# Patient Record
Sex: MALE | Race: WHITE | NOT HISPANIC OR LATINO | Employment: FULL TIME | URBAN - METROPOLITAN AREA
[De-identification: names, ages, dates, MRNs, and addresses within clinical notes are randomized per-mention and may not be internally consistent; named-entity substitution may affect disease eponyms.]

---

## 2023-05-01 DIAGNOSIS — E11.39 TYPE II OR UNSPECIFIED TYPE DIABETES MELLITUS WITH OPHTHALMIC MANIFESTATIONS, UNCONTROLLED(250.52) (MULTI): ICD-10-CM

## 2023-05-01 DIAGNOSIS — E11.65 TYPE II OR UNSPECIFIED TYPE DIABETES MELLITUS WITH OPHTHALMIC MANIFESTATIONS, UNCONTROLLED(250.52) (MULTI): ICD-10-CM

## 2023-06-07 ENCOUNTER — LAB (OUTPATIENT)
Dept: LAB | Facility: LAB | Age: 73
End: 2023-06-07
Payer: MEDICARE

## 2023-06-07 DIAGNOSIS — E11.65 UNCONTROLLED TYPE 2 DIABETES MELLITUS WITH HYPERGLYCEMIA (MULTI): ICD-10-CM

## 2023-06-07 DIAGNOSIS — E78.2 MIXED HYPERLIPIDEMIA: Primary | ICD-10-CM

## 2023-06-07 DIAGNOSIS — E78.2 MIXED HYPERLIPIDEMIA: ICD-10-CM

## 2023-06-07 PROBLEM — E78.5 HYPERLIPIDEMIA: Status: ACTIVE | Noted: 2023-06-07

## 2023-06-07 PROBLEM — N40.0 BENIGN ENLARGEMENT OF PROSTATE: Status: ACTIVE | Noted: 2023-06-07

## 2023-06-07 PROBLEM — M19.012 PRIMARY OSTEOARTHRITIS OF LEFT SHOULDER: Status: ACTIVE | Noted: 2023-06-07

## 2023-06-07 PROBLEM — M47.812 CERVICAL OSTEOARTHRITIS: Status: ACTIVE | Noted: 2023-06-07

## 2023-06-07 PROBLEM — I49.3 PREMATURE VENTRICULAR CONTRACTIONS: Status: ACTIVE | Noted: 2023-06-07

## 2023-06-07 PROBLEM — M72.0 CONTRACTURE OF PALMAR FASCIA (DUPUYTREN'S): Status: ACTIVE | Noted: 2023-06-07

## 2023-06-07 PROBLEM — R35.1 NOCTURIA: Status: ACTIVE | Noted: 2023-06-07

## 2023-06-07 LAB
ALANINE AMINOTRANSFERASE (SGPT) (U/L) IN SER/PLAS: 33 U/L (ref 10–52)
ALBUMIN (G/DL) IN SER/PLAS: 4.6 G/DL (ref 3.4–5)
ALKALINE PHOSPHATASE (U/L) IN SER/PLAS: 66 U/L (ref 33–136)
ANION GAP IN SER/PLAS: 13 MMOL/L (ref 10–20)
ASPARTATE AMINOTRANSFERASE (SGOT) (U/L) IN SER/PLAS: 23 U/L (ref 9–39)
BILIRUBIN TOTAL (MG/DL) IN SER/PLAS: 0.8 MG/DL (ref 0–1.2)
CALCIUM (MG/DL) IN SER/PLAS: 9.8 MG/DL (ref 8.6–10.6)
CARBON DIOXIDE, TOTAL (MMOL/L) IN SER/PLAS: 28 MMOL/L (ref 21–32)
CHLORIDE (MMOL/L) IN SER/PLAS: 105 MMOL/L (ref 98–107)
CHOLESTEROL (MG/DL) IN SER/PLAS: 151 MG/DL (ref 0–199)
CHOLESTEROL IN HDL (MG/DL) IN SER/PLAS: 44.5 MG/DL
CHOLESTEROL/HDL RATIO: 3.4
CREATININE (MG/DL) IN SER/PLAS: 0.64 MG/DL (ref 0.5–1.3)
ESTIMATED AVERAGE GLUCOSE FOR HBA1C: 137 MG/DL
GFR MALE: >90 ML/MIN/1.73M2
GLUCOSE (MG/DL) IN SER/PLAS: 112 MG/DL (ref 74–99)
HEMOGLOBIN A1C/HEMOGLOBIN TOTAL IN BLOOD: 6.4 %
LDL: 90 MG/DL (ref 0–99)
POTASSIUM (MMOL/L) IN SER/PLAS: 4.6 MMOL/L (ref 3.5–5.3)
PROTEIN TOTAL: 6.7 G/DL (ref 6.4–8.2)
SODIUM (MMOL/L) IN SER/PLAS: 141 MMOL/L (ref 136–145)
TRIGLYCERIDE (MG/DL) IN SER/PLAS: 82 MG/DL (ref 0–149)
UREA NITROGEN (MG/DL) IN SER/PLAS: 23 MG/DL (ref 6–23)
VLDL: 16 MG/DL (ref 0–40)

## 2023-06-07 PROCEDURE — 80061 LIPID PANEL: CPT

## 2023-06-07 PROCEDURE — 36415 COLL VENOUS BLD VENIPUNCTURE: CPT

## 2023-06-07 PROCEDURE — 80053 COMPREHEN METABOLIC PANEL: CPT

## 2023-06-07 PROCEDURE — 83036 HEMOGLOBIN GLYCOSYLATED A1C: CPT

## 2023-06-07 RX ORDER — ROSUVASTATIN CALCIUM 10 MG/1
1 TABLET, COATED ORAL DAILY
COMMUNITY
End: 2023-06-09 | Stop reason: SDUPTHER

## 2023-06-07 RX ORDER — TAMSULOSIN HYDROCHLORIDE 0.4 MG/1
1 CAPSULE ORAL NIGHTLY
COMMUNITY
Start: 2018-11-09 | End: 2023-06-09 | Stop reason: SDUPTHER

## 2023-06-09 DIAGNOSIS — R35.1 NOCTURIA: ICD-10-CM

## 2023-06-09 DIAGNOSIS — E11.39 TYPE II OR UNSPECIFIED TYPE DIABETES MELLITUS WITH OPHTHALMIC MANIFESTATIONS, UNCONTROLLED(250.52) (MULTI): ICD-10-CM

## 2023-06-09 DIAGNOSIS — N40.0 ENLARGED PROSTATE: ICD-10-CM

## 2023-06-09 DIAGNOSIS — E78.5 HYPERLIPIDEMIA, UNSPECIFIED HYPERLIPIDEMIA TYPE: ICD-10-CM

## 2023-06-09 DIAGNOSIS — E11.65 TYPE II OR UNSPECIFIED TYPE DIABETES MELLITUS WITH OPHTHALMIC MANIFESTATIONS, UNCONTROLLED(250.52) (MULTI): ICD-10-CM

## 2023-06-09 NOTE — TELEPHONE ENCOUNTER
----- Message from Nikhil Dawn DO sent at 6/9/2023  2:06 PM EDT -----  Please schedule follow-up visit    Very happy for you and that you are sugar is better.  We can review this in office further    Hemoglobin A1c 6.4 previous 7.1    Electrolytes, kidney, liver, and protein normal    Cholesterol looks fantastic across-the-board

## 2023-06-09 NOTE — RESULT ENCOUNTER NOTE
Please schedule follow-up visit    Very happy for you and that you are sugar is better.  We can review this in office further    Hemoglobin A1c 6.4 previous 7.1    Electrolytes, kidney, liver, and protein normal    Cholesterol looks fantastic across-the-board

## 2023-06-13 RX ORDER — ROSUVASTATIN CALCIUM 10 MG/1
10 TABLET, COATED ORAL DAILY
Qty: 90 TABLET | Refills: 0 | Status: SHIPPED | OUTPATIENT
Start: 2023-06-13 | End: 2023-09-29 | Stop reason: SDUPTHER

## 2023-06-13 RX ORDER — TAMSULOSIN HYDROCHLORIDE 0.4 MG/1
0.4 CAPSULE ORAL NIGHTLY
Qty: 90 CAPSULE | Refills: 0 | Status: SHIPPED | OUTPATIENT
Start: 2023-06-13 | End: 2023-09-29 | Stop reason: SDUPTHER

## 2023-06-20 ENCOUNTER — APPOINTMENT (OUTPATIENT)
Dept: PRIMARY CARE | Facility: CLINIC | Age: 73
End: 2023-06-20
Payer: MEDICARE

## 2023-09-08 DIAGNOSIS — E78.2 MIXED HYPERLIPIDEMIA: Primary | ICD-10-CM

## 2023-09-08 DIAGNOSIS — E11.65 UNCONTROLLED TYPE 2 DIABETES MELLITUS WITH HYPERGLYCEMIA (MULTI): ICD-10-CM

## 2023-09-08 DIAGNOSIS — Z11.59 NEED FOR HEPATITIS C SCREENING TEST: ICD-10-CM

## 2023-09-08 DIAGNOSIS — Z12.5 PROSTATE CANCER SCREENING: ICD-10-CM

## 2023-09-08 DIAGNOSIS — Z00.00 ANNUAL PHYSICAL EXAM: ICD-10-CM

## 2023-09-08 DIAGNOSIS — Z11.4 ENCOUNTER FOR SCREENING FOR HIV: ICD-10-CM

## 2023-09-08 DIAGNOSIS — N13.8 BENIGN PROSTATIC HYPERPLASIA WITH URINARY OBSTRUCTION: ICD-10-CM

## 2023-09-08 DIAGNOSIS — N40.1 BENIGN PROSTATIC HYPERPLASIA WITH URINARY OBSTRUCTION: ICD-10-CM

## 2023-09-09 ENCOUNTER — LAB (OUTPATIENT)
Dept: LAB | Facility: LAB | Age: 73
End: 2023-09-09
Payer: MEDICARE

## 2023-09-09 DIAGNOSIS — N40.1 BENIGN PROSTATIC HYPERPLASIA WITH URINARY OBSTRUCTION: ICD-10-CM

## 2023-09-09 DIAGNOSIS — E11.65 UNCONTROLLED TYPE 2 DIABETES MELLITUS WITH HYPERGLYCEMIA (MULTI): ICD-10-CM

## 2023-09-09 DIAGNOSIS — Z12.5 PROSTATE CANCER SCREENING: ICD-10-CM

## 2023-09-09 DIAGNOSIS — E78.2 MIXED HYPERLIPIDEMIA: ICD-10-CM

## 2023-09-09 DIAGNOSIS — N13.8 BENIGN PROSTATIC HYPERPLASIA WITH URINARY OBSTRUCTION: ICD-10-CM

## 2023-09-09 LAB
ALANINE AMINOTRANSFERASE (SGPT) (U/L) IN SER/PLAS: 24 U/L (ref 10–52)
ALBUMIN (G/DL) IN SER/PLAS: 4.4 G/DL (ref 3.4–5)
ALBUMIN (MG/L) IN URINE: <7 MG/L
ALBUMIN/CREATININE (UG/MG) IN URINE: NORMAL UG/MG CRT (ref 0–30)
ALKALINE PHOSPHATASE (U/L) IN SER/PLAS: 62 U/L (ref 33–136)
ANION GAP IN SER/PLAS: 13 MMOL/L (ref 10–20)
ASPARTATE AMINOTRANSFERASE (SGOT) (U/L) IN SER/PLAS: 19 U/L (ref 9–39)
BILIRUBIN TOTAL (MG/DL) IN SER/PLAS: 0.7 MG/DL (ref 0–1.2)
CALCIUM (MG/DL) IN SER/PLAS: 9.6 MG/DL (ref 8.6–10.6)
CARBON DIOXIDE, TOTAL (MMOL/L) IN SER/PLAS: 28 MMOL/L (ref 21–32)
CHLORIDE (MMOL/L) IN SER/PLAS: 105 MMOL/L (ref 98–107)
CHOLESTEROL (MG/DL) IN SER/PLAS: 126 MG/DL (ref 0–199)
CHOLESTEROL IN HDL (MG/DL) IN SER/PLAS: 43 MG/DL
CHOLESTEROL/HDL RATIO: 2.9
CREATININE (MG/DL) IN SER/PLAS: 0.58 MG/DL (ref 0.5–1.3)
CREATININE (MG/DL) IN URINE: 61.2 MG/DL (ref 20–370)
ESTIMATED AVERAGE GLUCOSE FOR HBA1C: 128 MG/DL
GFR MALE: >90 ML/MIN/1.73M2
GLUCOSE (MG/DL) IN SER/PLAS: 117 MG/DL (ref 74–99)
HEMOGLOBIN A1C/HEMOGLOBIN TOTAL IN BLOOD: 6.1 %
LDL: 64 MG/DL (ref 0–99)
POTASSIUM (MMOL/L) IN SER/PLAS: 4 MMOL/L (ref 3.5–5.3)
PROSTATE SPECIFIC ANTIGEN,SCREEN: 3.61 NG/ML (ref 0–4)
PROTEIN TOTAL: 6.4 G/DL (ref 6.4–8.2)
SODIUM (MMOL/L) IN SER/PLAS: 142 MMOL/L (ref 136–145)
TRIGLYCERIDE (MG/DL) IN SER/PLAS: 94 MG/DL (ref 0–149)
UREA NITROGEN (MG/DL) IN SER/PLAS: 17 MG/DL (ref 6–23)
VLDL: 19 MG/DL (ref 0–40)

## 2023-09-09 PROCEDURE — 80061 LIPID PANEL: CPT

## 2023-09-09 PROCEDURE — 80053 COMPREHEN METABOLIC PANEL: CPT

## 2023-09-09 PROCEDURE — 83036 HEMOGLOBIN GLYCOSYLATED A1C: CPT

## 2023-09-09 PROCEDURE — 82043 UR ALBUMIN QUANTITATIVE: CPT

## 2023-09-09 PROCEDURE — G0103 PSA SCREENING: HCPCS

## 2023-09-09 PROCEDURE — 36415 COLL VENOUS BLD VENIPUNCTURE: CPT

## 2023-09-09 PROCEDURE — 82570 ASSAY OF URINE CREATININE: CPT

## 2023-09-12 NOTE — RESULT ENCOUNTER NOTE
Please schedule a visit so we can review blood work in office:  Hemoglobin A1c 6.1 previous 6.4, 7.1  Electrolytes, kidney, liver, urine for albumin normal  PSA 3.61 previous 2.88, 2.63, 2.62, an increase of 0.73 in the past 10 months which can be concerning for possible pathology  Cholesterol 126, 43, 64, 94

## 2023-09-29 ENCOUNTER — OFFICE VISIT (OUTPATIENT)
Dept: PRIMARY CARE | Facility: CLINIC | Age: 73
End: 2023-09-29
Payer: MEDICARE

## 2023-09-29 VITALS
SYSTOLIC BLOOD PRESSURE: 122 MMHG | DIASTOLIC BLOOD PRESSURE: 78 MMHG | BODY MASS INDEX: 22.08 KG/M2 | HEART RATE: 70 BPM | WEIGHT: 141 LBS

## 2023-09-29 DIAGNOSIS — E78.2 MIXED HYPERLIPIDEMIA: ICD-10-CM

## 2023-09-29 DIAGNOSIS — E11.9 CONTROLLED TYPE 2 DIABETES MELLITUS WITHOUT COMPLICATION, WITHOUT LONG-TERM CURRENT USE OF INSULIN (MULTI): ICD-10-CM

## 2023-09-29 DIAGNOSIS — M19.012 PRIMARY OSTEOARTHRITIS OF BOTH SHOULDERS: ICD-10-CM

## 2023-09-29 DIAGNOSIS — Z00.00 MEDICARE ANNUAL WELLNESS VISIT, SUBSEQUENT: Primary | ICD-10-CM

## 2023-09-29 DIAGNOSIS — N40.1 BENIGN PROSTATIC HYPERPLASIA WITH URINARY OBSTRUCTION: ICD-10-CM

## 2023-09-29 DIAGNOSIS — N13.8 BENIGN PROSTATIC HYPERPLASIA WITH URINARY OBSTRUCTION: ICD-10-CM

## 2023-09-29 DIAGNOSIS — M19.011 PRIMARY OSTEOARTHRITIS OF BOTH SHOULDERS: ICD-10-CM

## 2023-09-29 DIAGNOSIS — R97.20 INCREASED PROSTATE SPECIFIC ANTIGEN (PSA) VELOCITY: ICD-10-CM

## 2023-09-29 PROBLEM — G89.29 CHRONIC LLQ PAIN: Status: RESOLVED | Noted: 2023-09-29 | Resolved: 2023-09-29

## 2023-09-29 PROBLEM — G89.29 CHRONIC LLQ PAIN: Status: ACTIVE | Noted: 2023-09-29

## 2023-09-29 PROBLEM — M54.2 NECK PAIN: Status: ACTIVE | Noted: 2021-05-19

## 2023-09-29 PROBLEM — L73.9 FOLLICULITIS: Status: RESOLVED | Noted: 2023-09-29 | Resolved: 2023-09-29

## 2023-09-29 PROBLEM — M54.2 NECK PAIN: Status: RESOLVED | Noted: 2021-05-19 | Resolved: 2023-09-29

## 2023-09-29 PROBLEM — L73.9 FOLLICULITIS: Status: ACTIVE | Noted: 2023-09-29

## 2023-09-29 PROBLEM — R29.898 SHOULDER WEAKNESS: Status: RESOLVED | Noted: 2023-09-29 | Resolved: 2023-09-29

## 2023-09-29 PROBLEM — R35.1 NOCTURIA: Status: RESOLVED | Noted: 2023-06-07 | Resolved: 2023-09-29

## 2023-09-29 PROBLEM — D48.5 NEOPLASM OF UNCERTAIN BEHAVIOR OF SKIN: Status: RESOLVED | Noted: 2017-09-28 | Resolved: 2023-09-29

## 2023-09-29 PROBLEM — L73.8 OTHER SPECIFIED FOLLICULAR DISORDERS: Status: RESOLVED | Noted: 2017-09-28 | Resolved: 2023-09-29

## 2023-09-29 PROBLEM — L57.0 ACTINIC KERATOSIS: Status: ACTIVE | Noted: 2017-09-28

## 2023-09-29 PROBLEM — R29.898 SHOULDER WEAKNESS: Status: ACTIVE | Noted: 2023-09-29

## 2023-09-29 PROBLEM — D48.5 NEOPLASM OF UNCERTAIN BEHAVIOR OF SKIN: Status: ACTIVE | Noted: 2017-09-28

## 2023-09-29 PROBLEM — R10.32 CHRONIC LLQ PAIN: Status: ACTIVE | Noted: 2023-09-29

## 2023-09-29 PROBLEM — R10.32 CHRONIC LLQ PAIN: Status: RESOLVED | Noted: 2023-09-29 | Resolved: 2023-09-29

## 2023-09-29 PROBLEM — L73.8 OTHER SPECIFIED FOLLICULAR DISORDERS: Status: ACTIVE | Noted: 2017-09-28

## 2023-09-29 PROBLEM — M75.112 NONTRAUMATIC INCOMPLETE TEAR OF LEFT ROTATOR CUFF: Status: ACTIVE | Noted: 2023-09-29

## 2023-09-29 PROBLEM — M67.912 DYSFUNCTION OF LEFT ROTATOR CUFF: Status: ACTIVE | Noted: 2023-09-29

## 2023-09-29 PROBLEM — E11.65 UNCONTROLLED TYPE 2 DIABETES MELLITUS WITH HYPERGLYCEMIA (MULTI): Status: RESOLVED | Noted: 2023-06-07 | Resolved: 2023-09-29

## 2023-09-29 PROCEDURE — 1036F TOBACCO NON-USER: CPT | Performed by: FAMILY MEDICINE

## 2023-09-29 PROCEDURE — 20610 DRAIN/INJ JOINT/BURSA W/O US: CPT | Performed by: FAMILY MEDICINE

## 2023-09-29 PROCEDURE — 1160F RVW MEDS BY RX/DR IN RCRD: CPT | Performed by: FAMILY MEDICINE

## 2023-09-29 PROCEDURE — 3044F HG A1C LEVEL LT 7.0%: CPT | Performed by: FAMILY MEDICINE

## 2023-09-29 PROCEDURE — 1159F MED LIST DOCD IN RCRD: CPT | Performed by: FAMILY MEDICINE

## 2023-09-29 PROCEDURE — 3078F DIAST BP <80 MM HG: CPT | Performed by: FAMILY MEDICINE

## 2023-09-29 PROCEDURE — 3074F SYST BP LT 130 MM HG: CPT | Performed by: FAMILY MEDICINE

## 2023-09-29 PROCEDURE — 1170F FXNL STATUS ASSESSED: CPT | Performed by: FAMILY MEDICINE

## 2023-09-29 PROCEDURE — 1126F AMNT PAIN NOTED NONE PRSNT: CPT | Performed by: FAMILY MEDICINE

## 2023-09-29 PROCEDURE — G0439 PPPS, SUBSEQ VISIT: HCPCS | Performed by: FAMILY MEDICINE

## 2023-09-29 PROCEDURE — 99214 OFFICE O/P EST MOD 30 MIN: CPT | Performed by: FAMILY MEDICINE

## 2023-09-29 RX ORDER — TRIAMCINOLONE ACETONIDE 40 MG/ML
40 INJECTION, SUSPENSION INTRA-ARTICULAR; INTRAMUSCULAR ONCE
Status: COMPLETED | OUTPATIENT
Start: 2023-09-29 | End: 2023-09-29

## 2023-09-29 RX ORDER — ROSUVASTATIN CALCIUM 10 MG/1
10 TABLET, COATED ORAL DAILY
Qty: 90 TABLET | Refills: 1 | Status: SHIPPED | OUTPATIENT
Start: 2023-09-29 | End: 2024-04-30 | Stop reason: SDUPTHER

## 2023-09-29 RX ORDER — TAMSULOSIN HYDROCHLORIDE 0.4 MG/1
0.4 CAPSULE ORAL NIGHTLY
Qty: 90 CAPSULE | Refills: 1 | Status: SHIPPED | OUTPATIENT
Start: 2023-09-29 | End: 2024-03-27 | Stop reason: SDUPTHER

## 2023-09-29 RX ADMIN — TRIAMCINOLONE ACETONIDE 40 MG: 40 INJECTION, SUSPENSION INTRA-ARTICULAR; INTRAMUSCULAR at 08:16

## 2023-09-29 ASSESSMENT — ACTIVITIES OF DAILY LIVING (ADL)
DOING_HOUSEWORK: INDEPENDENT
GROCERY_SHOPPING: INDEPENDENT
MANAGING_FINANCES: INDEPENDENT
BATHING: INDEPENDENT
DRESSING: INDEPENDENT
TAKING_MEDICATION: INDEPENDENT

## 2023-09-29 ASSESSMENT — ENCOUNTER SYMPTOMS
OCCASIONAL FEELINGS OF UNSTEADINESS: 0
LOSS OF SENSATION IN FEET: 0
DEPRESSION: 0

## 2023-09-29 ASSESSMENT — PATIENT HEALTH QUESTIONNAIRE - PHQ9
2. FEELING DOWN, DEPRESSED OR HOPELESS: NOT AT ALL
SUM OF ALL RESPONSES TO PHQ9 QUESTIONS 1 AND 2: 0
1. LITTLE INTEREST OR PLEASURE IN DOING THINGS: NOT AT ALL

## 2023-09-29 NOTE — PROGRESS NOTES
Subjective   Reason for Visit: Khadar Hale is an 73 y.o. male here for a Medicare Annual Wellness Visit Subsequent, Hyperlipidemia, and Anxiety.     Past Medical, Surgical, and Family History reviewed and updated in chart.    Reviewed all medications by prescribing practitioner or clinical pharmacist (such as prescriptions, OTCs, herbal therapies and supplements) and documented in the medical record.    HPI  1.  Medicare wellness.  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    2.  Hyperlipidemia.  Currently taking rosuvastatin 10 mg daily  Had blood work performed in anticipation of this visit, noted below  Denies medication side effects, including myalgias    3.  Diabetes.  Hemoglobin A1c 6.1 previous 6.4, 7.1  Electrolytes, kidney, liver, urine for albumin normal  Cholesterol 126, 43, 64, 94    4.  BPH.  Currently taking tamsulosin 0.4 mg at bedtime  PSA 3.61 previous 2.88, 2.63, 2.62, an increase of 0.73 in the past 10 months which can be concerning for possible pathology    5.  Left-sided Dupuytren's contracture.  Had revision of his palmar fasciectomy of the left hand via Dr. Gonzalez this past summer    6.  Bilateral shoulder pain.  Has been told that he has severe arthritis bilaterally, received cortisone injections about 6 to 7 months ago, hoping to have both today  He was told that he needs shoulder replacements but he is not going to even entertain that thought    Review of Systems  All pertinent positive symptoms are included in the history of present illness.    All other systems have been reviewed and are negative and noncontributory to this patient's current ailments.    Current Outpatient Medications   Medication Instructions    empagliflozin (JARDIANCE) 10 mg, oral, Daily    rosuvastatin (CRESTOR) 10 mg,  oral, Daily    tamsulosin (FLOMAX) 0.4 mg, oral, Nightly     Allergies   Allergen Reactions    Codeine Nausea Only     Immunization History   Administered Date(s) Administered    Pfizer Purple Cap SARS-CoV-2 02/06/2021, 03/01/2021, 12/04/2021     Past Surgical History:   Procedure Laterality Date    ABLATION OF DYSRHYTHMIC FOCUS  05/29/2014    Catheter Ablation    HAND TENDON SURGERY  05/29/2014    Hand Incision Tendon Sheath Of A Finger    HERNIA REPAIR  05/29/2014    Hernia Repair     No family history on file.    Social History     Tobacco Use    Smoking status: Never    Smokeless tobacco: Never     Patient Self Assessment of Health Status  Patient Self Assessment: Good    Nutrition and Exercise  Current Diet: Well Balanced Diet  Adequate Fluid Intake: Yes  Caffeine: Yes  Exercise Frequency: Infrequently    Functional Ability/Level of Safety  Cognitive Impairment Observed: No cognitive impairment observed  Cognitive Impairment Reported: No cognitive impairment reported by patient or family    Home Safety Risk Factors: None    Objective   Visit Vitals  /78   Pulse 70   Wt 64 kg (141 lb)   BMI 22.08 kg/m²   Smoking Status Never   BSA 1.74 m²      Physical Exam  CONSTITUTIONAL - well nourished, well developed, looks like stated age, in no acute distress, not ill-appearing, and not tired appearing  SKIN - normal skin color and pigmentation  EYES - normal external exam  LUNGS - breathing comfortably, no dyspnea  EXTREMITIES - no deformities noticeable; bilateral shoulders with some crepitus, range of motion normal, no obvious bony deformities, redness, warmth, ecchymosis  NEUROLOGICAL - oriented and no focal signs  PSYCHIATRIC - alert, pleasant and cordial, age-appropriate, not anxious or depressed appearing    Joint Injection Large/Arthrocentesis: bilateral glenohumeral on 9/29/2023 8:04 AM  Details: 22 G needle, posterior approach  Outcome: tolerated well, no immediate complications    1% lidocaine (2 mL) and  Kenalog 40 mg injected into the joint as noted on the examination. It was successful, without complication, and tolerated extremely well.  Procedure, treatment alternatives, risks and benefits explained, specific risks discussed. Consent was given by the patient. Immediately prior to procedure a time out was called to verify the correct patient, procedure, equipment, support staff and site/side marked as required. Patient was prepped and draped in the usual sterile fashion.           Assessment/Plan   Problem List Items Addressed This Visit       Benign enlargement of prostate    Current Assessment & Plan     Tamsulosin continues to benefit you, so no changes to medication recommended         Relevant Medications    tamsulosin (Flomax) 0.4 mg 24 hr capsule    Hyperlipidemia    Current Assessment & Plan     Stable, no changes to medication recommended         Relevant Medications    rosuvastatin (Crestor) 10 mg tablet    Primary osteoarthritis of left shoulder    Relevant Medications  Cortisone injection provided today for both acute and long-term relief  Please return to the clinic if pain, swelling, or signs of infection occur. It will be necessary to further    evaluate you at that time, along with considering the possibility of an orthopedic consultation to provide further medical management    triamcinolone acetonide (Kenalog-40) injection 40 mg (Completed)    triamcinolone acetonide (Kenalog-40) injection 40 mg (Completed)    Controlled type 2 diabetes mellitus without complication, without long-term current use of insulin (CMS/formerly [facility])    Current Assessment & Plan     Congratulations, hemoglobin A1c has improved, no changes to medication recommended  Provided you with 3 months of samples for Jardiance, I will send another 3 months to the pharmacy for you         Relevant Medications    empagliflozin (Jardiance) 10 mg    Medicare annual wellness visit, subsequent - Primary    Current Assessment & Plan     Questions  were answered and reviewed         Increased prostate specific antigen (PSA) velocity    Current Assessment & Plan     PSA has increased by 0.73 in the past 10 months so due to this velocity increase, please repeat PSA in 2 months and if it continues to increase then urology consult warranted to evaluate for possible prostate cancer         Relevant Orders    PSA     Current Outpatient Medications   Medication Instructions    empagliflozin (JARDIANCE) 10 mg, oral, Daily    rosuvastatin (CRESTOR) 10 mg, oral, Daily    tamsulosin (FLOMAX) 0.4 mg, oral, Nightly     Patient Care Team:  Nikhil Dawn DO as PCP - General (Family Medicine)  Nikhil Dawn DO as PCP - [facility] Attributed Provider

## 2023-09-29 NOTE — ASSESSMENT & PLAN NOTE
Chief Complaint   Patient presents with    Follow-up     Visit Vitals  BP (!) 186/105 (BP 1 Location: Left upper arm, BP Patient Position: Sitting, BP Cuff Size: Large adult)   Pulse 60   Resp 12   Ht 5' 11\" (1.803 m)   Wt 209 lb (94.8 kg)   SpO2 98%   BMI 29.15 kg/m²     1. \"Have you been to the ER, urgent care clinic since your last visit? Hospitalized since your last visit? \" No    2. \"Have you seen or consulted any other health care providers outside of the [address] [place] [address] since your last visit? \" No     3. For patients aged 39-70: Has the patient had a colonoscopy / FIT/ Cologuard? NA - based on age      If the patient is female:    4. For patients aged 41-77: Has the patient had a mammogram within the past 2 years? NA - based on age or sex      11. For patients aged 21-65: Has the patient had a pap smear?  NA - based on age or sex PSA has increased by 0.73 in the past 10 months so due to this velocity increase, please repeat PSA in 2 months and if it continues to increase then urology consult warranted to evaluate for possible prostate cancer

## 2023-09-29 NOTE — ASSESSMENT & PLAN NOTE
Congratulations, hemoglobin A1c has improved, no changes to medication recommended  Provided you with 3 months of samples for Jardiance, I will send another 3 months to the pharmacy for you

## 2024-01-06 ENCOUNTER — LAB (OUTPATIENT)
Dept: LAB | Facility: LAB | Age: 74
End: 2024-01-06
Payer: MEDICARE

## 2024-01-06 DIAGNOSIS — R97.20 INCREASED PROSTATE SPECIFIC ANTIGEN (PSA) VELOCITY: ICD-10-CM

## 2024-01-06 LAB — PSA SERPL-MCNC: 3.62 NG/ML

## 2024-01-06 PROCEDURE — 36415 COLL VENOUS BLD VENIPUNCTURE: CPT

## 2024-01-06 PROCEDURE — 84153 ASSAY OF PSA TOTAL: CPT

## 2024-01-07 NOTE — RESULT ENCOUNTER NOTE
PSA 3.62, previously 3.61, 2.88, 2.63, 2.62, an increase of 0.73 in the past 10 months which can be concerning for possible pathology    Glad to see that this has remained stable so we will continue to monitor annually

## 2024-03-27 ENCOUNTER — OFFICE VISIT (OUTPATIENT)
Dept: PRIMARY CARE | Facility: CLINIC | Age: 74
End: 2024-03-27
Payer: MEDICARE

## 2024-03-27 VITALS
DIASTOLIC BLOOD PRESSURE: 70 MMHG | WEIGHT: 140 LBS | HEART RATE: 60 BPM | BODY MASS INDEX: 21.97 KG/M2 | SYSTOLIC BLOOD PRESSURE: 124 MMHG | HEIGHT: 67 IN

## 2024-03-27 DIAGNOSIS — E78.2 MIXED HYPERLIPIDEMIA: ICD-10-CM

## 2024-03-27 DIAGNOSIS — N13.8 BENIGN PROSTATIC HYPERPLASIA WITH URINARY OBSTRUCTION: ICD-10-CM

## 2024-03-27 DIAGNOSIS — E11.9 CONTROLLED TYPE 2 DIABETES MELLITUS WITHOUT COMPLICATION, WITHOUT LONG-TERM CURRENT USE OF INSULIN (MULTI): Primary | ICD-10-CM

## 2024-03-27 DIAGNOSIS — M19.012 PRIMARY OSTEOARTHRITIS OF BOTH SHOULDERS: ICD-10-CM

## 2024-03-27 DIAGNOSIS — N40.1 BENIGN PROSTATIC HYPERPLASIA WITH URINARY OBSTRUCTION: ICD-10-CM

## 2024-03-27 DIAGNOSIS — M19.011 PRIMARY OSTEOARTHRITIS OF BOTH SHOULDERS: ICD-10-CM

## 2024-03-27 PROBLEM — L29.9 ITCHING: Status: ACTIVE | Noted: 2024-03-27

## 2024-03-27 PROBLEM — B35.0 TINEA CAPITIS: Status: ACTIVE | Noted: 2024-03-27

## 2024-03-27 PROBLEM — M75.102 NONTRAUMATIC TEAR OF LEFT ROTATOR CUFF: Status: ACTIVE | Noted: 2024-03-27

## 2024-03-27 PROBLEM — D17.0 LIPOMA OF SKIN AND SUBCUTANEOUS TISSUE OF NECK: Status: ACTIVE | Noted: 2024-03-27

## 2024-03-27 PROBLEM — R00.2 PALPITATIONS: Status: ACTIVE | Noted: 2024-03-27

## 2024-03-27 PROBLEM — M25.649 STIFFNESS OF FINGER JOINT: Status: ACTIVE | Noted: 2024-03-27

## 2024-03-27 PROBLEM — R20.0 NUMBNESS OF HAND: Status: ACTIVE | Noted: 2024-03-27

## 2024-03-27 PROBLEM — M75.00 ADHESIVE CAPSULITIS OF SHOULDER: Status: ACTIVE | Noted: 2024-03-27

## 2024-03-27 PROBLEM — K40.91 RECURRENT INGUINAL HERNIA: Status: ACTIVE | Noted: 2024-03-27

## 2024-03-27 PROCEDURE — 20610 DRAIN/INJ JOINT/BURSA W/O US: CPT | Performed by: FAMILY MEDICINE

## 2024-03-27 PROCEDURE — 1036F TOBACCO NON-USER: CPT | Performed by: FAMILY MEDICINE

## 2024-03-27 PROCEDURE — 3048F LDL-C <100 MG/DL: CPT | Performed by: FAMILY MEDICINE

## 2024-03-27 PROCEDURE — 3044F HG A1C LEVEL LT 7.0%: CPT | Performed by: FAMILY MEDICINE

## 2024-03-27 PROCEDURE — 3078F DIAST BP <80 MM HG: CPT | Performed by: FAMILY MEDICINE

## 2024-03-27 PROCEDURE — 1159F MED LIST DOCD IN RCRD: CPT | Performed by: FAMILY MEDICINE

## 2024-03-27 PROCEDURE — 1160F RVW MEDS BY RX/DR IN RCRD: CPT | Performed by: FAMILY MEDICINE

## 2024-03-27 PROCEDURE — 99214 OFFICE O/P EST MOD 30 MIN: CPT | Performed by: FAMILY MEDICINE

## 2024-03-27 PROCEDURE — 3074F SYST BP LT 130 MM HG: CPT | Performed by: FAMILY MEDICINE

## 2024-03-27 PROCEDURE — 1123F ACP DISCUSS/DSCN MKR DOCD: CPT | Performed by: FAMILY MEDICINE

## 2024-03-27 PROCEDURE — 1158F ADVNC CARE PLAN TLK DOCD: CPT | Performed by: FAMILY MEDICINE

## 2024-03-27 RX ORDER — TRIAMCINOLONE ACETONIDE 40 MG/ML
40 INJECTION, SUSPENSION INTRA-ARTICULAR; INTRAMUSCULAR ONCE
Status: COMPLETED | OUTPATIENT
Start: 2024-03-27 | End: 2024-03-27

## 2024-03-27 RX ORDER — TAMSULOSIN HYDROCHLORIDE 0.4 MG/1
0.4 CAPSULE ORAL NIGHTLY
Qty: 90 CAPSULE | Refills: 1 | Status: SHIPPED | OUTPATIENT
Start: 2024-03-27 | End: 2024-09-23

## 2024-03-27 RX ADMIN — TRIAMCINOLONE ACETONIDE 40 MG: 40 INJECTION, SUSPENSION INTRA-ARTICULAR; INTRAMUSCULAR at 11:48

## 2024-03-27 NOTE — ASSESSMENT & PLAN NOTE
A refill for tamsulosin has been sent to the pharmacy as it has been improving your symptoms. We will continue to monitor your PSA levels yearly.

## 2024-03-27 NOTE — ASSESSMENT & PLAN NOTE
We will obtain a repeat hemoglobin A1C at this visit. Once the results are available, we will send over a refill for your medication.

## 2024-03-27 NOTE — ASSESSMENT & PLAN NOTE
Provide you with cortisone injections in both shoulders, hoping for both acute and long-term relief  OTC turmeric was recommended to help with joint pain and inflammation

## 2024-03-27 NOTE — PROGRESS NOTES
Subjective   Patient ID: Khadar Hale is a 73 y.o. male who presents for Shoulder Pain, Diabetes, and Hyperlipidemia.    Past Medical, Surgical, and Family History reviewed and updated in chart.    Reviewed all medications by prescribing practitioner or clinical pharmacist (such as prescriptions, OTCs, herbal therapies and supplements) and documented in the medical record.    HPI  1.  Bilateral shoulder pain  Patient has been getting steroid injections for osteoarthritis in both shoulders  Khadar states that the injections improve his symptoms for about 5 months  The orthopedic surgeon has told Khadar he will likely need surgery, but Khadar prefers steroid injections for now as they are reducing his symptoms  Would like to know if there is something else that is OTC he can use to help with joint pain    2.  Hyperlipidemia  His last lipid panel was completed in 9/9/23 and was excellent  -chol was 126, HDL 43, LDL 64 and triglycerides were 94  He is currently on crestor 10 mg and is requesting a refill at this time    3.  Benign enlargement of prostate  He is currently on Flomax 0.4 mg which has been controlling his symptoms  Khadar is requesting a refill of the medication at this time  PSA from 1/2024 was 3.62 and was previously 3.20 from one year ago    4.  Type II diabetes  Khadar's last hemoglobin A1c from 9/2023 was 6.1%  He is currently on Jardiance 10 mg and is requesting a refill for his medication   Most recent CMP was remarkable for no dysfunction regarding his liver and or kidney    Review of Systems  All pertinent positive symptoms are included in the history of present illness.    All other systems have been reviewed and are negative and noncontributory to this patient's current ailments.    Past Medical History:   Diagnosis Date    Benign prostatic hyperplasia without lower urinary tract symptoms 11/29/2022    Benign enlargement of prostate    Encounter for general adult medical examination without  "abnormal findings 01/28/2020    Medicare annual wellness visit, initial    Encounter for general adult medical examination without abnormal findings 04/06/2021    Encounter for Medicare annual wellness exam    Encounter for screening for malignant neoplasm of colon 11/11/2020    Encounter for screening colonoscopy    Hyperlipidemia, unspecified 11/29/2022    Hyperlipidemia    Other hypertrophic disorders of the skin 08/25/2016    Cutaneous skin tags    Personal history of other benign neoplasm 09/18/2019    History of other benign neoplasm    Personal history of other diseases of the musculoskeletal system and connective tissue 09/19/2019    History of neck pain    Personal history of other infectious and parasitic diseases 09/18/2019    History of tinea capitis    Personal history of other specified conditions 08/25/2016    History of itching    Ventricular premature depolarization 03/26/2018    Premature ventricular contractions     Past Surgical History:   Procedure Laterality Date    ABLATION OF DYSRHYTHMIC FOCUS  05/29/2014    Catheter Ablation    HAND TENDON SURGERY  05/29/2014    Hand Incision Tendon Sheath Of A Finger    HERNIA REPAIR  05/29/2014    Hernia Repair     Social History     Tobacco Use    Smoking status: Never    Smokeless tobacco: Never     No family history on file.  Immunization History   Administered Date(s) Administered    Pfizer Purple Cap SARS-CoV-2 02/06/2021, 03/01/2021, 12/04/2021     Current Outpatient Medications   Medication Instructions    empagliflozin (JARDIANCE) 10 mg, oral, Daily    rosuvastatin (CRESTOR) 10 mg, oral, Daily    tamsulosin (FLOMAX) 0.4 mg, oral, Nightly     Allergies   Allergen Reactions    Codeine Nausea Only       Objective   Vitals:    03/27/24 1102   BP: 124/70   Pulse: 60   Weight: 63.5 kg (140 lb)   Height: 1.702 m (5' 7\")     Body mass index is 21.93 kg/m².    BP Readings from Last 3 Encounters:   03/27/24 124/70   09/29/23 122/78   02/13/23 118/70      Wt " Readings from Last 3 Encounters:   03/27/24 63.5 kg (140 lb)   09/29/23 64 kg (141 lb)   02/13/23 65.3 kg (144 lb)      Physical Exam  CONSTITUTIONAL - well nourished, well developed, looks like stated age, in no acute distress, not ill-appearing, and not tired appearing  SKIN - normal skin color and pigmentation  EYES - normal external exam  LUNGS - breathing comfortably, no dyspnea  EXTREMITIES - no deformities noticeable; bilateral shoulders with some crepitus, range of motion normal, no obvious bony deformities, redness, warmth, ecchymosis  NEUROLOGICAL - oriented and no focal signs  PSYCHIATRIC - alert, pleasant and cordial, age-appropriate, not anxious or depressed appearing    Joint Injection Large/Arthrocentesis: bilateral glenohumeral on 3/27/2024 11:48 AM  Indications: pain  Details: 22 G needle, posterior approach  Outcome: tolerated well, no immediate complications  Consent was given by the patient. Immediately prior to procedure a time out was called to verify the correct patient, procedure, equipment, support staff and site/side marked as required. Patient was prepped and draped in the usual sterile fashion.       Assessment/Plan   Problem List Items Addressed This Visit       Benign enlargement of prostate     A refill for tamsulosin has been sent to the pharmacy as it has been improving your symptoms. We will continue to monitor your PSA levels yearly.         Relevant Medications    tamsulosin (Flomax) 0.4 mg 24 hr capsule    Other Relevant Orders    PSA    Hyperlipidemia     Please complete your repeat lipid panel at your earliest convenience. Once the results are available, we will send over a refill for your Crestor.         Relevant Orders    Lipid panel    Controlled type 2 diabetes mellitus without complication, without long-term current use of insulin (CMS/formerly [facility]) - Primary     We will obtain a repeat hemoglobin A1C at this visit. Once the results are available, we will send over a refill for  your medication.          Relevant Orders    Comprehensive metabolic panel    Hemoglobin A1c    Primary osteoarthritis of both shoulders     Provide you with cortisone injections in both shoulders, hoping for both acute and long-term relief  OTC turmeric was recommended to help with joint pain and inflammation         Relevant Medications    triamcinolone acetonide (Kenalog-40) injection 40 mg (Completed) (Start on 3/27/2024 12:15 PM)    triamcinolone acetonide (Kenalog-40) injection 40 mg (Completed) (Start on 3/27/2024 12:15 PM)    Other Relevant Orders    Joint Injection Large/Arthrocentesis: bilateral glenohumeral

## 2024-03-27 NOTE — ASSESSMENT & PLAN NOTE
Please complete your repeat lipid panel at your earliest convenience. Once the results are available, we will send over a refill for your Crestor.

## 2024-04-24 ENCOUNTER — TELEPHONE (OUTPATIENT)
Dept: PRIMARY CARE | Facility: CLINIC | Age: 74
End: 2024-04-24
Payer: MEDICARE

## 2024-04-24 DIAGNOSIS — M67.912 DYSFUNCTION OF LEFT ROTATOR CUFF: Primary | ICD-10-CM

## 2024-04-24 NOTE — TELEPHONE ENCOUNTER
Wants ortho referral for shoulder as the injection did not provide benefit     Not sure if you want to add a specific doctor at this time

## 2024-04-27 ENCOUNTER — LAB (OUTPATIENT)
Dept: LAB | Facility: LAB | Age: 74
End: 2024-04-27
Payer: MEDICARE

## 2024-04-27 DIAGNOSIS — N40.1 BENIGN PROSTATIC HYPERPLASIA WITH URINARY OBSTRUCTION: ICD-10-CM

## 2024-04-27 DIAGNOSIS — E11.9 CONTROLLED TYPE 2 DIABETES MELLITUS WITHOUT COMPLICATION, WITHOUT LONG-TERM CURRENT USE OF INSULIN (MULTI): ICD-10-CM

## 2024-04-27 DIAGNOSIS — E78.2 MIXED HYPERLIPIDEMIA: ICD-10-CM

## 2024-04-27 DIAGNOSIS — N13.8 BENIGN PROSTATIC HYPERPLASIA WITH URINARY OBSTRUCTION: ICD-10-CM

## 2024-04-27 PROCEDURE — 80053 COMPREHEN METABOLIC PANEL: CPT

## 2024-04-27 PROCEDURE — 36415 COLL VENOUS BLD VENIPUNCTURE: CPT

## 2024-04-27 PROCEDURE — 83036 HEMOGLOBIN GLYCOSYLATED A1C: CPT

## 2024-04-27 PROCEDURE — 80061 LIPID PANEL: CPT

## 2024-04-27 PROCEDURE — 84153 ASSAY OF PSA TOTAL: CPT

## 2024-04-28 LAB
ALBUMIN SERPL BCP-MCNC: 4.5 G/DL (ref 3.4–5)
ALP SERPL-CCNC: 52 U/L (ref 33–136)
ALT SERPL W P-5'-P-CCNC: 36 U/L (ref 10–52)
ANION GAP SERPL CALC-SCNC: 15 MMOL/L (ref 10–20)
AST SERPL W P-5'-P-CCNC: 22 U/L (ref 9–39)
BILIRUB SERPL-MCNC: 0.8 MG/DL (ref 0–1.2)
BUN SERPL-MCNC: 17 MG/DL (ref 6–23)
CALCIUM SERPL-MCNC: 9.3 MG/DL (ref 8.6–10.6)
CHLORIDE SERPL-SCNC: 101 MMOL/L (ref 98–107)
CHOLEST SERPL-MCNC: 144 MG/DL (ref 0–199)
CHOLESTEROL/HDL RATIO: 2.6
CO2 SERPL-SCNC: 29 MMOL/L (ref 21–32)
CREAT SERPL-MCNC: 0.63 MG/DL (ref 0.5–1.3)
EGFRCR SERPLBLD CKD-EPI 2021: >90 ML/MIN/1.73M*2
GLUCOSE SERPL-MCNC: 125 MG/DL (ref 74–99)
HDLC SERPL-MCNC: 56.1 MG/DL
LDLC SERPL CALC-MCNC: 72 MG/DL
NON HDL CHOLESTEROL: 88 MG/DL (ref 0–149)
POTASSIUM SERPL-SCNC: 4.7 MMOL/L (ref 3.5–5.3)
PROT SERPL-MCNC: 6.7 G/DL (ref 6.4–8.2)
PSA SERPL-MCNC: 4.49 NG/ML
SODIUM SERPL-SCNC: 140 MMOL/L (ref 136–145)
TRIGL SERPL-MCNC: 82 MG/DL (ref 0–149)
VLDL: 16 MG/DL (ref 0–40)

## 2024-04-29 LAB
EST. AVERAGE GLUCOSE BLD GHB EST-MCNC: 148 MG/DL
HBA1C MFR BLD: 6.8 %

## 2024-04-30 DIAGNOSIS — E78.2 MIXED HYPERLIPIDEMIA: ICD-10-CM

## 2024-04-30 DIAGNOSIS — E11.9 CONTROLLED TYPE 2 DIABETES MELLITUS WITHOUT COMPLICATION, WITHOUT LONG-TERM CURRENT USE OF INSULIN (MULTI): ICD-10-CM

## 2024-04-30 RX ORDER — ROSUVASTATIN CALCIUM 10 MG/1
10 TABLET, COATED ORAL DAILY
Qty: 90 TABLET | Refills: 1 | Status: SHIPPED | OUTPATIENT
Start: 2024-04-30 | End: 2024-10-27

## 2024-04-30 NOTE — RESULT ENCOUNTER NOTE
Hemoglobin A1c is 6.8 previous 6.1.  Ideally we would like to see this a little bit better, but goal is less than 7.0% so no changes to the medication are recommended at this time    Electrolytes, kidney, liver normal    PSA elevated at 4.49 previously 3.62.  I realize you have an enlarged prostate, but when the number jumped by 0.75 in a year there is some concern that this could be prostate cancer so I would recommend repeating the blood test in 3 months and if it continues to jump, then we should get urology involved to determine whether or not we have to worry about cancer    Cholesterol looks excellent, so no changes to medication are recommended    I have sent your medicine out to the mail out pharmacy on file

## 2024-07-29 ENCOUNTER — APPOINTMENT (OUTPATIENT)
Dept: ORTHOPEDIC SURGERY | Facility: CLINIC | Age: 74
End: 2024-07-29
Payer: MEDICARE

## 2024-07-29 ENCOUNTER — HOSPITAL ENCOUNTER (OUTPATIENT)
Dept: RADIOLOGY | Facility: CLINIC | Age: 74
Discharge: HOME | End: 2024-07-29
Payer: MEDICARE

## 2024-07-29 VITALS — HEIGHT: 67 IN | BODY MASS INDEX: 21.97 KG/M2 | WEIGHT: 140 LBS

## 2024-07-29 DIAGNOSIS — M25.551 RIGHT HIP PAIN: ICD-10-CM

## 2024-07-29 DIAGNOSIS — M76.31 ILIOTIBIAL BAND SYNDROME OF RIGHT SIDE: ICD-10-CM

## 2024-07-29 PROCEDURE — 73502 X-RAY EXAM HIP UNI 2-3 VIEWS: CPT | Mod: RIGHT SIDE | Performed by: RADIOLOGY

## 2024-07-29 PROCEDURE — 1036F TOBACCO NON-USER: CPT | Performed by: ORTHOPAEDIC SURGERY

## 2024-07-29 PROCEDURE — 1160F RVW MEDS BY RX/DR IN RCRD: CPT | Performed by: ORTHOPAEDIC SURGERY

## 2024-07-29 PROCEDURE — 73502 X-RAY EXAM HIP UNI 2-3 VIEWS: CPT | Mod: RT

## 2024-07-29 PROCEDURE — 3048F LDL-C <100 MG/DL: CPT | Performed by: ORTHOPAEDIC SURGERY

## 2024-07-29 PROCEDURE — 99204 OFFICE O/P NEW MOD 45 MIN: CPT | Performed by: ORTHOPAEDIC SURGERY

## 2024-07-29 PROCEDURE — 3008F BODY MASS INDEX DOCD: CPT | Performed by: ORTHOPAEDIC SURGERY

## 2024-07-29 PROCEDURE — 3044F HG A1C LEVEL LT 7.0%: CPT | Performed by: ORTHOPAEDIC SURGERY

## 2024-07-29 PROCEDURE — 1123F ACP DISCUSS/DSCN MKR DOCD: CPT | Performed by: ORTHOPAEDIC SURGERY

## 2024-07-29 PROCEDURE — 1159F MED LIST DOCD IN RCRD: CPT | Performed by: ORTHOPAEDIC SURGERY

## 2024-07-29 ASSESSMENT — PAIN DESCRIPTION - DESCRIPTORS: DESCRIPTORS: ACHING

## 2024-07-29 ASSESSMENT — PAIN - FUNCTIONAL ASSESSMENT: PAIN_FUNCTIONAL_ASSESSMENT: 0-10

## 2024-07-29 ASSESSMENT — PAIN SCALES - GENERAL: PAINLEVEL_OUTOF10: 5 - MODERATE PAIN

## 2024-07-29 NOTE — PROGRESS NOTES
5 months of lateral hip pain absence of injury  No history of osteoporosis or malignancy  Pain is worse with activity no treatment so far  No groin pain  No numbness or tingling and no back pain  Past medical,family and social histories have been reviewed and are up to date.  Constitutional: Well-developed well-nourished   Eyes: Sclerae anicteric, pupils equal and round  HENT: Normocephalic atraumatic  Cardiovascular: Pulses full, regular rate and rhythm  Respiratory: Breathing not labored, no wheezing  Integumentary: Skin intact, no lesions or rashes  Neurological: Sensation intact, no gross strength deficits, reflexes equal  Psychiatric: Alert oriented and appropriate  Hematologic/lymphatic: No lymphadenopathy  Right hip: There is no mass swelling erythema she is tender along the trochanter and lateral aspect of the thigh has full mobility of the hip some pain with adduction  X-rays are negative              X-rays show no evidence of bony lesion minimal arthritis impression trochanteric bursitis IT band syndrome plan injection therapy referral  L Inj/Asp: R greater trochanteric bursa on 7/31/2024 4:13 PM  Indications: pain  Details: 21 G needle, lateral approach  Medications: 40 mg methylPREDNISolone acetate 40 mg/mL; 2 mL lidocaine 20 mg/mL (2 %)

## 2024-07-30 ENCOUNTER — APPOINTMENT (OUTPATIENT)
Dept: ORTHOPEDIC SURGERY | Facility: HOSPITAL | Age: 74
End: 2024-07-30
Payer: MEDICARE

## 2024-07-31 PROCEDURE — 20610 DRAIN/INJ JOINT/BURSA W/O US: CPT | Performed by: ORTHOPAEDIC SURGERY

## 2024-07-31 RX ORDER — LIDOCAINE HYDROCHLORIDE 20 MG/ML
2 INJECTION, SOLUTION INFILTRATION; PERINEURAL
Status: COMPLETED | OUTPATIENT
Start: 2024-07-31 | End: 2024-07-31

## 2024-07-31 RX ORDER — METHYLPREDNISOLONE ACETATE 40 MG/ML
40 INJECTION, SUSPENSION INTRA-ARTICULAR; INTRALESIONAL; INTRAMUSCULAR; SOFT TISSUE
Status: COMPLETED | OUTPATIENT
Start: 2024-07-31 | End: 2024-07-31

## 2024-08-09 ENCOUNTER — APPOINTMENT (OUTPATIENT)
Dept: ORTHOPEDIC SURGERY | Facility: HOSPITAL | Age: 74
End: 2024-08-09
Payer: MEDICARE

## 2024-09-05 ENCOUNTER — EVALUATION (OUTPATIENT)
Dept: PHYSICAL THERAPY | Facility: CLINIC | Age: 74
End: 2024-09-05
Payer: MEDICARE

## 2024-09-05 DIAGNOSIS — M76.31 ILIOTIBIAL BAND SYNDROME OF RIGHT SIDE: ICD-10-CM

## 2024-09-05 PROCEDURE — 97530 THERAPEUTIC ACTIVITIES: CPT | Mod: GP

## 2024-09-05 PROCEDURE — 97161 PT EVAL LOW COMPLEX 20 MIN: CPT | Mod: GP

## 2024-09-05 ASSESSMENT — ENCOUNTER SYMPTOMS
LOSS OF SENSATION IN FEET: 0
OCCASIONAL FEELINGS OF UNSTEADINESS: 0
DEPRESSION: 0

## 2024-09-05 NOTE — PROGRESS NOTES
Physical Therapy Evaluation    Patient Name: Khadar Hale  MRN: [id number]  Today's Date: 09/05/24        Insurance:  Visit number: 1 of [place]  Insurance Type: Payor: MEDICARE / Plan: MEDICARE PART A AND B / Product Type: *No Product type* /   Authorization or Plan of Care date Range: CERT DATES     Therapy diagnoses:   1. Iliotibial band syndrome of right side  Referral to Physical Therapy           Precautions:  DM  Fall Risk: None    SUBJECTIVE:  Pain in right hip. Had xray and an injection. Has relief with the injection. Walks [id number] steps a day. 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 A lot of walking at his jobs.   Sleep is not great. Sleep is OK but not only because of hip.   No other issues or Ortho injuries.   Annoying pain mostly. Does not keep him from doing stuff. Bothers him.   Pt would like exercise to do at home.     Pain:  Constant moderate pain   3-4/10  Home Living:  Stairs at home, basement     Prior level of function:  INDP   Works 5 x a week     OBJECTIVE:    Hip AROM: (degrees) Left Right   Flexion 60 57   Extension neutral neutral   Abduction 15 12   External Rotation 30 30   Internal Rotation 30 30       Hip Strength: MMT Left Right   Flexion 4/5 4/5   Extension 4/5 4/5   Abduction 4/5 4/5   External Rotation 4-/5 4-/5   Internal Rotation 4/5 4/5     Core strength FAIR+  Knee MMT WFL ADY 4+/5  Knee ROM WFL     Positive Special Tests: mild response to KENNETH right, SLR negative, scour positive right     Gait: mild off loading RLE, mild lean to the left. Good step length and arm swing.   Palpation: ITB tenderness   Flexibility:   Hamstrings: MOD deficit ady    Quadriceps: MOD deficit ady    Hip Flexors: mild to MOD deficit ady     ASSESSMENT:  Pt with skilled need for a PT HEP for strength of the hip. Pt would like to do via an HEP. Pt has pain in the hip related to prolonged WB or position. Pt has hip OA. Pt has a very active lifestyle. Pt will need strength to support the hip joint. Pt has  tightness and pain in the ITB area with loss of flexibility. Pt will benefit from dynamic strength thru movement. Pt advised in use of CP, good shoes, and rest activity cycles. Pt does not wish to come back at this time for follow ups. HEP given per below. Will discharge in 30 days if he does not return.     Pt presents with the following deficits/problems that indicate a skilled need for PT:   Flexibility, pain, strength, ROM, functional mobility tolerance.   Level of Complexity: low     TREATMENT:  PROM right LE  RLE stretches    HEP review per below     PATIENT EDUCATION:  HEP  goals  safety  POC  interventions selected    Access Code: [email or web address]  URL: [email or web address]  Date: 09/05/2024  Prepared by: Magi Coello    Exercises  - Small Range Straight Leg Raise  - 1 x daily - 7 x weekly - 2 sets - 10 reps  - Supine Knee Extension Strengthening  - 1 x daily - 7 x weekly - 2 sets - 10 reps  - Beginner Bridge  - 1 x daily - 7 x weekly - 2 sets - 10 reps  - Sidelying Hip Abduction  - 1 x daily - 7 x weekly - 2 sets - 10 reps  - Seated Long Arc Quad  - 1 x daily - 7 x weekly - 2 sets - 10 reps  - Standing Hip Abduction with Counter Support  - 1 x daily - 7 x weekly - 2 sets - 10 reps  - Standing Hip Extension with Counter Support  - 1 x daily - 7 x weekly - 2 sets - 10 reps  - Tandem Stance with Support  - 1 x daily - 7 x weekly - 2 sets - 4 reps - 30 hold    PLAN:   Pt to be seen 1x times per week for total weeks.     Pt POC to include:  Therapeutic EX, Therapeutic ACT, NMR, Self care, Manual therapy, Gait training, CP/MHP, Education      Rehab potential:  Good   Plan of care agreement  Patient   GOALS:  Active       PT Problem       PT Goal 1       Start:  09/05/24    Expected End:  11/04/24       1) pt to do HEP consistently and return to PT if needed for further stretches and HEP.   2) improved Flexibility per HEP   3) return if he has questions or concerns.          Patient Stated Goal 1        Start:  09/05/24    Expected End:  11/04/24       Do HEP because he has work

## 2024-12-18 DIAGNOSIS — R97.20 INCREASED PROSTATE SPECIFIC ANTIGEN (PSA) VELOCITY: Primary | ICD-10-CM

## 2024-12-18 DIAGNOSIS — E11.9 CONTROLLED TYPE 2 DIABETES MELLITUS WITHOUT COMPLICATION, WITHOUT LONG-TERM CURRENT USE OF INSULIN (MULTI): ICD-10-CM

## 2024-12-18 DIAGNOSIS — E78.2 MIXED HYPERLIPIDEMIA: ICD-10-CM

## 2024-12-20 ENCOUNTER — LAB (OUTPATIENT)
Dept: LAB | Facility: LAB | Age: 74
End: 2024-12-20
Payer: MEDICARE

## 2024-12-20 DIAGNOSIS — E11.9 CONTROLLED TYPE 2 DIABETES MELLITUS WITHOUT COMPLICATION, WITHOUT LONG-TERM CURRENT USE OF INSULIN (MULTI): ICD-10-CM

## 2024-12-20 DIAGNOSIS — R97.20 INCREASED PROSTATE SPECIFIC ANTIGEN (PSA) VELOCITY: ICD-10-CM

## 2024-12-20 DIAGNOSIS — E78.2 MIXED HYPERLIPIDEMIA: ICD-10-CM

## 2024-12-20 LAB
ALBUMIN SERPL BCP-MCNC: 4.3 G/DL (ref 3.4–5)
ALP SERPL-CCNC: 55 U/L (ref 33–136)
ALT SERPL W P-5'-P-CCNC: 27 U/L (ref 10–52)
ANION GAP SERPL CALC-SCNC: 13 MMOL/L (ref 10–20)
AST SERPL W P-5'-P-CCNC: 15 U/L (ref 9–39)
BILIRUB SERPL-MCNC: 0.6 MG/DL (ref 0–1.2)
BUN SERPL-MCNC: 19 MG/DL (ref 6–23)
CALCIUM SERPL-MCNC: 9.3 MG/DL (ref 8.6–10.6)
CHLORIDE SERPL-SCNC: 103 MMOL/L (ref 98–107)
CHOLEST SERPL-MCNC: 125 MG/DL (ref 0–199)
CHOLESTEROL/HDL RATIO: 2.8
CO2 SERPL-SCNC: 28 MMOL/L (ref 21–32)
CREAT SERPL-MCNC: 0.66 MG/DL (ref 0.5–1.3)
EGFRCR SERPLBLD CKD-EPI 2021: >90 ML/MIN/1.73M*2
EST. AVERAGE GLUCOSE BLD GHB EST-MCNC: 120 MG/DL
GLUCOSE SERPL-MCNC: 138 MG/DL (ref 74–99)
HBA1C MFR BLD: 5.8 %
HDLC SERPL-MCNC: 44.8 MG/DL
LDLC SERPL CALC-MCNC: 64 MG/DL
NON HDL CHOLESTEROL: 80 MG/DL (ref 0–149)
POTASSIUM SERPL-SCNC: 4.4 MMOL/L (ref 3.5–5.3)
PROT SERPL-MCNC: 6.4 G/DL (ref 6.4–8.2)
PSA SERPL-MCNC: 4.2 NG/ML
SODIUM SERPL-SCNC: 140 MMOL/L (ref 136–145)
TRIGL SERPL-MCNC: 83 MG/DL (ref 0–149)
VLDL: 17 MG/DL (ref 0–40)

## 2024-12-20 PROCEDURE — 36415 COLL VENOUS BLD VENIPUNCTURE: CPT

## 2024-12-20 PROCEDURE — 83036 HEMOGLOBIN GLYCOSYLATED A1C: CPT

## 2024-12-20 PROCEDURE — 84153 ASSAY OF PSA TOTAL: CPT

## 2024-12-20 PROCEDURE — 80053 COMPREHEN METABOLIC PANEL: CPT

## 2024-12-20 PROCEDURE — 80061 LIPID PANEL: CPT

## 2024-12-22 NOTE — RESULT ENCOUNTER NOTE
Schedule office visit to review.  Last visit March 2024:  Hemoglobin A1c excellent at 5.8 previous 6.8, 6.1  PSA 4.20 previously 4.49  Electrolytes, kidney, liver normal  Cholesterol looks excellent at 125 with LDL 64

## 2024-12-26 DIAGNOSIS — E78.2 MIXED HYPERLIPIDEMIA: ICD-10-CM

## 2024-12-26 DIAGNOSIS — E11.9 CONTROLLED TYPE 2 DIABETES MELLITUS WITHOUT COMPLICATION, WITHOUT LONG-TERM CURRENT USE OF INSULIN (MULTI): ICD-10-CM

## 2024-12-26 RX ORDER — TAMSULOSIN HYDROCHLORIDE 0.4 MG/1
0.4 CAPSULE ORAL DAILY
Status: CANCELLED | OUTPATIENT
Start: 2024-12-26

## 2024-12-26 RX ORDER — ROSUVASTATIN CALCIUM 10 MG/1
10 TABLET, COATED ORAL DAILY
Qty: 90 TABLET | Refills: 1 | Status: CANCELLED | OUTPATIENT
Start: 2024-12-26 | End: 2025-06-24

## 2024-12-26 RX ORDER — TAMSULOSIN HYDROCHLORIDE 0.4 MG/1
0.4 CAPSULE ORAL DAILY
COMMUNITY
End: 2025-01-02 | Stop reason: SDUPTHER

## 2025-01-02 DIAGNOSIS — N40.1 BENIGN PROSTATIC HYPERPLASIA WITH URINARY OBSTRUCTION: ICD-10-CM

## 2025-01-02 DIAGNOSIS — N13.8 BENIGN PROSTATIC HYPERPLASIA WITH URINARY OBSTRUCTION: ICD-10-CM

## 2025-01-02 RX ORDER — TAMSULOSIN HYDROCHLORIDE 0.4 MG/1
0.4 CAPSULE ORAL DAILY
Qty: 30 CAPSULE | Refills: 0 | Status: SHIPPED | OUTPATIENT
Start: 2025-01-02 | End: 2025-02-01

## 2025-01-16 ENCOUNTER — APPOINTMENT (OUTPATIENT)
Dept: PRIMARY CARE | Facility: CLINIC | Age: 75
End: 2025-01-16
Payer: MEDICARE

## 2025-01-16 VITALS
DIASTOLIC BLOOD PRESSURE: 80 MMHG | HEIGHT: 67 IN | HEART RATE: 62 BPM | BODY MASS INDEX: 22.91 KG/M2 | SYSTOLIC BLOOD PRESSURE: 124 MMHG | WEIGHT: 146 LBS

## 2025-01-16 DIAGNOSIS — I49.3 PREMATURE VENTRICULAR CONTRACTIONS: ICD-10-CM

## 2025-01-16 DIAGNOSIS — E11.9 CONTROLLED TYPE 2 DIABETES MELLITUS WITHOUT COMPLICATION, WITHOUT LONG-TERM CURRENT USE OF INSULIN (MULTI): ICD-10-CM

## 2025-01-16 DIAGNOSIS — Z00.00 MEDICARE ANNUAL WELLNESS VISIT, SUBSEQUENT: Primary | ICD-10-CM

## 2025-01-16 DIAGNOSIS — N40.1 BENIGN PROSTATIC HYPERPLASIA WITH URINARY OBSTRUCTION: ICD-10-CM

## 2025-01-16 DIAGNOSIS — N13.8 BENIGN PROSTATIC HYPERPLASIA WITH URINARY OBSTRUCTION: ICD-10-CM

## 2025-01-16 DIAGNOSIS — E78.2 MIXED HYPERLIPIDEMIA: ICD-10-CM

## 2025-01-16 PROCEDURE — 3079F DIAST BP 80-89 MM HG: CPT | Performed by: FAMILY MEDICINE

## 2025-01-16 PROCEDURE — 3074F SYST BP LT 130 MM HG: CPT | Performed by: FAMILY MEDICINE

## 2025-01-16 PROCEDURE — 3008F BODY MASS INDEX DOCD: CPT | Performed by: FAMILY MEDICINE

## 2025-01-16 PROCEDURE — 1170F FXNL STATUS ASSESSED: CPT | Performed by: FAMILY MEDICINE

## 2025-01-16 PROCEDURE — 99214 OFFICE O/P EST MOD 30 MIN: CPT | Performed by: FAMILY MEDICINE

## 2025-01-16 PROCEDURE — 1036F TOBACCO NON-USER: CPT | Performed by: FAMILY MEDICINE

## 2025-01-16 PROCEDURE — 1123F ACP DISCUSS/DSCN MKR DOCD: CPT | Performed by: FAMILY MEDICINE

## 2025-01-16 PROCEDURE — G0439 PPPS, SUBSEQ VISIT: HCPCS | Performed by: FAMILY MEDICINE

## 2025-01-16 PROCEDURE — 1159F MED LIST DOCD IN RCRD: CPT | Performed by: FAMILY MEDICINE

## 2025-01-16 PROCEDURE — 1158F ADVNC CARE PLAN TLK DOCD: CPT | Performed by: FAMILY MEDICINE

## 2025-01-16 RX ORDER — ROSUVASTATIN CALCIUM 10 MG/1
10 TABLET, COATED ORAL DAILY
Qty: 90 TABLET | Refills: 1 | Status: SHIPPED | OUTPATIENT
Start: 2025-01-16 | End: 2025-07-15

## 2025-01-16 RX ORDER — TAMSULOSIN HYDROCHLORIDE 0.4 MG/1
0.4 CAPSULE ORAL DAILY
Qty: 90 CAPSULE | Refills: 1 | Status: SHIPPED | OUTPATIENT
Start: 2025-01-16 | End: 2025-07-15

## 2025-01-16 ASSESSMENT — ACTIVITIES OF DAILY LIVING (ADL)
TAKING_MEDICATION: INDEPENDENT
BATHING: INDEPENDENT
GROCERY_SHOPPING: INDEPENDENT
DRESSING: INDEPENDENT
DOING_HOUSEWORK: INDEPENDENT

## 2025-01-16 ASSESSMENT — ENCOUNTER SYMPTOMS
OCCASIONAL FEELINGS OF UNSTEADINESS: 0
LOSS OF SENSATION IN FEET: 1
DEPRESSION: 0

## 2025-01-16 ASSESSMENT — PATIENT HEALTH QUESTIONNAIRE - PHQ9
1. LITTLE INTEREST OR PLEASURE IN DOING THINGS: NOT AT ALL
2. FEELING DOWN, DEPRESSED OR HOPELESS: NOT AT ALL
SUM OF ALL RESPONSES TO PHQ9 QUESTIONS 1 AND 2: 0

## 2025-01-16 NOTE — ASSESSMENT & PLAN NOTE
Known PVCs previously evaluated by cardiology, known diagnosis to patient  No signs/symptoms for ACS  It's regularly irregular on physical exam

## 2025-01-16 NOTE — PROGRESS NOTES
Subjective   Reason for Visit: Khadar Hale is an 74 y.o. male here for a Diabetes, Hyperlipidemia, and Medicare Annual Wellness Visit Subsequent.     Past Medical, Surgical, and Family History reviewed and updated in chart.    Reviewed all medications by prescribing practitioner or clinical pharmacist (such as prescriptions, OTCs, herbal therapies and supplements) and documented in the medical record.    HPI  1.  Medicare wellness.  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    2.  Hyperlipidemia.  Currently taking rosuvastatin 10 mg daily  Blood work from December 20 looks excellent with cholesterol 125, LDL 64    3.  Diabetes.  Hemoglobin A1c excellent at 5.8 previous 6.8, 6.1  Currently taking Jardiance as noted on the chart    4.  BPH.  Currently taking tamsulosin 0.4 mg at bedtime  PSA 4.20, previously 4.49, 3.61, 2.88, 2.63, 2.62    5.  PVCs.  Previously evaluated by cardiologist  Known diagnosis to patient   No symptoms today    Review of Systems  All pertinent positive symptoms are included in the history of present illness.    All other systems have been reviewed and are negative and noncontributory to this patient's current ailments.    Past Medical History:   Diagnosis Date    Benign prostatic hyperplasia without lower urinary tract symptoms 11/29/2022    Benign enlargement of prostate    Encounter for general adult medical examination without abnormal findings 01/28/2020    Medicare annual wellness visit, initial    Encounter for general adult medical examination without abnormal findings 04/06/2021    Encounter for Medicare annual wellness exam    Encounter for screening for malignant neoplasm of colon 11/11/2020    Encounter for screening colonoscopy    Hyperlipidemia, unspecified 11/29/2022    Hyperlipidemia  "   Other hypertrophic disorders of the skin 08/25/2016    Cutaneous skin tags    Personal history of other benign neoplasm 09/18/2019    History of other benign neoplasm    Personal history of other diseases of the musculoskeletal system and connective tissue 09/19/2019    History of neck pain    Personal history of other infectious and parasitic diseases 09/18/2019    History of tinea capitis    Personal history of other specified conditions 08/25/2016    History of itching    Ventricular premature depolarization 03/26/2018    Premature ventricular contractions     Past Surgical History:   Procedure Laterality Date    ABLATION OF DYSRHYTHMIC FOCUS  05/29/2014    Catheter Ablation    HAND TENDON SURGERY  05/29/2014    Hand Incision Tendon Sheath Of A Finger    HERNIA REPAIR  05/29/2014    Hernia Repair     Social History     Tobacco Use    Smoking status: Never    Smokeless tobacco: Never     No family history on file.  Allergies   Allergen Reactions    Codeine Nausea Only     Immunization History   Administered Date(s) Administered    COVID-19, mRNA, LNP-S, PF, 30 mcg/0.3 mL dose 02/06/2021, 03/01/2021, 12/04/2021     Current Outpatient Medications   Medication Instructions    empagliflozin (JARDIANCE) 10 mg, oral, Daily    rosuvastatin (CRESTOR) 10 mg, oral, Daily    tamsulosin (FLOMAX) 0.4 mg, oral, Daily       Patient Self Assessment of Health Status  Patient Self Assessment: Good    Nutrition and Exercise  Current Diet: Well Balanced Diet  Adequate Fluid Intake: Yes  Caffeine: Yes  Exercise Frequency: Infrequently    Functional Ability/Level of Safety  Cognitive Impairment Observed: No cognitive impairment observed  Cognitive Impairment Reported: No cognitive impairment reported by patient or family    Home Safety Risk Factors: None    Objective   Visit Vitals  /80   Pulse 62   Ht 1.702 m (5' 7\")   Wt 66.2 kg (146 lb)   BMI 22.87 kg/m²   Smoking Status Never   BSA 1.77 m²      Lab on 12/20/2024 "   Component Date Value    Glucose 12/20/2024 138 (H)     Sodium 12/20/2024 140     Potassium 12/20/2024 4.4     Chloride 12/20/2024 103     Bicarbonate 12/20/2024 28     Anion Gap 12/20/2024 13     Urea Nitrogen 12/20/2024 19     Creatinine 12/20/2024 0.66     eGFR 12/20/2024 >90     Calcium 12/20/2024 9.3     Albumin 12/20/2024 4.3     Alkaline Phosphatase 12/20/2024 55     Total Protein 12/20/2024 6.4     AST 12/20/2024 15     Bilirubin, Total 12/20/2024 0.6     ALT 12/20/2024 27     Cholesterol 12/20/2024 125     HDL-Cholesterol 12/20/2024 44.8     Cholesterol/HDL Ratio 12/20/2024 2.8     LDL Calculated 12/20/2024 64     VLDL 12/20/2024 17     Triglycerides 12/20/2024 83     Non HDL Cholesterol 12/20/2024 80     Hemoglobin A1C 12/20/2024 5.8 (H)     Estimated Average Glucose 12/20/2024 120     Prostate Specific AG 12/20/2024 4.20 (H)      The ASCVD Risk score (García DK, et al., 2019) failed to calculate for the following reasons:    The valid total cholesterol range is 130 to 320 mg/dL    Physical Exam  CONSTITUTIONAL - well nourished, well developed, looks like stated age, in no acute distress, not ill-appearing, and not tired appearing  SKIN - normal skin color and pigmentation, normal skin turgor without rash, lesions, or nodules visualized  HEAD - no trauma, normocephalic  EYES - pupils are equal and reactive to light, extraocular muscles are intact, and normal external exam  ENT - TM's intact, no injection, no signs of infection, uvula midline, normal tongue movement and throat normal, no exudate  NECK - supple without rigidity, no neck mass was observed, no thyromegaly or thyroid nodules  CHEST - clear to auscultation, no wheezing, no crackles and no rales, good effort  CARDIAC - regularly irregular,  skipped beats, no murmur  ABDOMEN - no organomegaly, soft, nontender, nondistended, normal bowel sounds, no guarding/rebound/rigidity, negative McBurney sign and negative Trejo sign  EXTREMITIES - no obvious  or evident edema, no obvious or evident deformities  NEUROLOGICAL - normal gait, normal balance, normal motor, no ataxia, DTRs equal and symmetrical; alert, oriented and no focal signs  PSYCHIATRIC - alert, pleasant and cordial, age-appropriate  IMMUNOLOGIC - no cervical lymphadenopathy    Assessment/Plan   Problem List Items Addressed This Visit       Benign enlargement of prostate    Current Assessment & Plan     A refill for tamsulosin has been sent to the pharmacy as it has been improving your symptoms. We will continue to monitor your PSA levels yearly.         Relevant Medications    tamsulosin (Flomax) 0.4 mg 24 hr capsule    Hyperlipidemia    Current Assessment & Plan     Reviewed lab work, well controlled, refill sent         Relevant Medications    rosuvastatin (Crestor) 10 mg tablet    Premature ventricular contractions    Current Assessment & Plan     Known PVCs previously evaluated by cardiology, known diagnosis to patient  No signs/symptoms for ACS  It's regularly irregular on physical exam         Controlled type 2 diabetes mellitus without complication, without long-term current use of insulin (Multi)    Current Assessment & Plan     Stable, no changes to medication recommended         Relevant Medications    empagliflozin (Jardiance) 10 mg    Medicare annual wellness visit, subsequent - Primary    Current Assessment & Plan     Questions were answered and reviewed  Colon cancer screening up-to-date  Declined vaccinations          Patient Care Team:  Nikhil Dawn DO as PCP - General (Family Medicine)  Nikhil Dawn DO as PCP - [facility] Attributed Provider

## 2025-02-12 ENCOUNTER — OFFICE VISIT (OUTPATIENT)
Dept: PRIMARY CARE | Facility: CLINIC | Age: 75
End: 2025-02-12
Payer: MEDICARE

## 2025-02-12 VITALS
DIASTOLIC BLOOD PRESSURE: 70 MMHG | HEIGHT: 67 IN | HEART RATE: 66 BPM | BODY MASS INDEX: 22.44 KG/M2 | WEIGHT: 143 LBS | SYSTOLIC BLOOD PRESSURE: 120 MMHG

## 2025-02-12 DIAGNOSIS — J02.9 SORE THROAT: Primary | ICD-10-CM

## 2025-02-12 LAB — POC RAPID STREP: NEGATIVE

## 2025-02-12 PROCEDURE — 1036F TOBACCO NON-USER: CPT | Performed by: FAMILY MEDICINE

## 2025-02-12 PROCEDURE — 1123F ACP DISCUSS/DSCN MKR DOCD: CPT | Performed by: FAMILY MEDICINE

## 2025-02-12 PROCEDURE — 3078F DIAST BP <80 MM HG: CPT | Performed by: FAMILY MEDICINE

## 2025-02-12 PROCEDURE — 3008F BODY MASS INDEX DOCD: CPT | Performed by: FAMILY MEDICINE

## 2025-02-12 PROCEDURE — 1159F MED LIST DOCD IN RCRD: CPT | Performed by: FAMILY MEDICINE

## 2025-02-12 PROCEDURE — 3074F SYST BP LT 130 MM HG: CPT | Performed by: FAMILY MEDICINE

## 2025-02-12 PROCEDURE — 87880 STREP A ASSAY W/OPTIC: CPT | Performed by: FAMILY MEDICINE

## 2025-02-12 PROCEDURE — 99213 OFFICE O/P EST LOW 20 MIN: CPT | Performed by: FAMILY MEDICINE

## 2025-02-12 ASSESSMENT — PATIENT HEALTH QUESTIONNAIRE - PHQ9
SUM OF ALL RESPONSES TO PHQ9 QUESTIONS 1 AND 2: 0
2. FEELING DOWN, DEPRESSED OR HOPELESS: NOT AT ALL
1. LITTLE INTEREST OR PLEASURE IN DOING THINGS: NOT AT ALL

## 2025-02-12 NOTE — ASSESSMENT & PLAN NOTE
Strep test in the office today is negative. It is most likely that your sore throat is caused by one of the many viruses floating around. If your symptoms do not improve after 7-10 days, it is most likely that your symptoms are being caused by worsening of your acid reflux. If that is the case, I recommend over the counter 20mg Prilosec or omeprazole which you should take for 6-8 weeks. Even if your symptoms improve with the medication, complete the full 6 week course and follow up in the office.

## 2025-02-12 NOTE — PROGRESS NOTES
Subjective   Patient ID: Khadar Hale is a 74 y.o. male who presents for Sore Throat.    Past Medical, Surgical, and Family History reviewed and updated in chart.    Reviewed all medications by prescribing practitioner or clinical pharmacist (such as prescriptions, OTCs, herbal therapies and supplements) and documented in the medical record.    HPI  Khadar presents with a five-day history of sore throat. He denies experiencing any other symptoms, including fever, chills, congestion, rhinorrhea, nausea, vomiting, abdominal pain, or having been in contact with anyone who is sick. Khadra mentions experiencing acid reflux approximately once a week after lunch, which typically resolves with the use of TUMS. This reflux is often accompanied by excessive mucus in his throat.    Review of Systems  All pertinent positive symptoms are included in the history of present illness.    All other systems have been reviewed and are negative and noncontributory to this patient's current ailments.    Past Medical History:   Diagnosis Date    Benign prostatic hyperplasia without lower urinary tract symptoms 11/29/2022    Benign enlargement of prostate    Encounter for general adult medical examination without abnormal findings 01/28/2020    Medicare annual wellness visit, initial    Encounter for general adult medical examination without abnormal findings 04/06/2021    Encounter for Medicare annual wellness exam    Encounter for screening for malignant neoplasm of colon 11/11/2020    Encounter for screening colonoscopy    Hyperlipidemia, unspecified 11/29/2022    Hyperlipidemia    Other hypertrophic disorders of the skin 08/25/2016    Cutaneous skin tags    Personal history of other benign neoplasm 09/18/2019    History of other benign neoplasm    Personal history of other diseases of the musculoskeletal system and connective tissue 09/19/2019    History of neck pain    Personal history of other infectious and parasitic diseases  "09/18/2019    History of tinea capitis    Personal history of other specified conditions 08/25/2016    History of itching    Ventricular premature depolarization 03/26/2018    Premature ventricular contractions     Past Surgical History:   Procedure Laterality Date    ABLATION OF DYSRHYTHMIC FOCUS  05/29/2014    Catheter Ablation    HAND TENDON SURGERY  05/29/2014    Hand Incision Tendon Sheath Of A Finger    HERNIA REPAIR  05/29/2014    Hernia Repair     Social History     Tobacco Use    Smoking status: Never    Smokeless tobacco: Never     No family history on file.  Immunization History   Administered Date(s) Administered    COVID-19, mRNA, LNP-S, PF, 30 mcg/0.3 mL dose 02/06/2021, 03/01/2021, 12/04/2021     Current Outpatient Medications   Medication Instructions    empagliflozin (JARDIANCE) 10 mg, oral, Daily    rosuvastatin (CRESTOR) 10 mg, oral, Daily    tamsulosin (FLOMAX) 0.4 mg, oral, Daily     Allergies   Allergen Reactions    Codeine Nausea Only       Objective   Vitals:    02/12/25 1146   BP: 120/70   Pulse: 66   Weight: 64.9 kg (143 lb)   Height: 1.702 m (5' 7\")     Body mass index is 22.4 kg/m².    BP Readings from Last 3 Encounters:   02/12/25 120/70   01/16/25 124/80   03/27/24 124/70      Wt Readings from Last 3 Encounters:   02/12/25 64.9 kg (143 lb)   01/16/25 66.2 kg (146 lb)   07/29/24 63.5 kg (140 lb)        Office Visit on 02/12/2025   Component Date Value    POC Rapid Strep 02/12/2025 Negative      Physical Exam  CONSTITUTIONAL - well nourished, well developed, looks like stated age, in no acute distress, not ill-appearing, and not tired appearing  ENT - no signs of infection, uvula midline, normal tongue movement and throat normal, no exudate, nasal passage without discharge and patent  NECK - supple without rigidity, no neck mass was observed, no thyromegaly or thyroid nodules  PSYCHIATRIC - alert, pleasant and cordial, age-appropriate  IMMUNOLOGIC - no cervical " lymphadenopathy    Assessment/Plan   Problem List Items Addressed This Visit       Sore throat - Primary     Strep test in the office today is negative. It is most likely that your sore throat is caused by one of the many viruses floating around. If your symptoms do not improve after 7-10 days, it is most likely that your symptoms are being caused by worsening of your acid reflux. If that is the case, I recommend over the counter 20mg Prilosec or omeprazole which you should take for 6-8 weeks. Even if your symptoms improve with the medication, complete the full 6 week course and follow up in the office.         Relevant Orders    POCT Rapid Strep A manually resulted (Completed)

## 2025-03-17 ENCOUNTER — DOCUMENTATION (OUTPATIENT)
Dept: PHYSICAL THERAPY | Facility: CLINIC | Age: 75
End: 2025-03-17
Payer: MEDICARE

## 2025-03-17 NOTE — PROGRESS NOTES
Physical Therapy    Discharge Summary    Name: Khadar Hale  MRN: [id number]  : 1950  Date: 25    Discharge Summary: PT    Discharge Information: date of last visit per chart     Therapy Summary: Pt is discharged from Physical Therapy Services.   Please see last note for discharge details.         Rehab Discharge Reason: Failed to schedule and/or keep follow-up appointment(s)

## 2025-04-01 ENCOUNTER — OFFICE VISIT (OUTPATIENT)
Dept: PRIMARY CARE | Facility: CLINIC | Age: 75
End: 2025-04-01
Payer: MEDICARE

## 2025-04-01 VITALS
WEIGHT: 138 LBS | HEART RATE: 67 BPM | HEIGHT: 67 IN | BODY MASS INDEX: 21.66 KG/M2 | DIASTOLIC BLOOD PRESSURE: 80 MMHG | SYSTOLIC BLOOD PRESSURE: 124 MMHG

## 2025-04-01 DIAGNOSIS — J15.9 BACTERIAL PNEUMONIA: Primary | ICD-10-CM

## 2025-04-01 DIAGNOSIS — R05.1 ACUTE COUGH: ICD-10-CM

## 2025-04-01 DIAGNOSIS — H10.32 ACUTE BACTERIAL CONJUNCTIVITIS OF LEFT EYE: ICD-10-CM

## 2025-04-01 DIAGNOSIS — R68.83 CHILLS: ICD-10-CM

## 2025-04-01 LAB
POC RAPID INFLUENZA A: NEGATIVE
POC RAPID INFLUENZA B: NEGATIVE

## 2025-04-01 PROCEDURE — 3008F BODY MASS INDEX DOCD: CPT | Performed by: FAMILY MEDICINE

## 2025-04-01 PROCEDURE — 99214 OFFICE O/P EST MOD 30 MIN: CPT | Performed by: FAMILY MEDICINE

## 2025-04-01 PROCEDURE — 3074F SYST BP LT 130 MM HG: CPT | Performed by: FAMILY MEDICINE

## 2025-04-01 PROCEDURE — 1159F MED LIST DOCD IN RCRD: CPT | Performed by: FAMILY MEDICINE

## 2025-04-01 PROCEDURE — 1123F ACP DISCUSS/DSCN MKR DOCD: CPT | Performed by: FAMILY MEDICINE

## 2025-04-01 PROCEDURE — 87804 INFLUENZA ASSAY W/OPTIC: CPT | Performed by: FAMILY MEDICINE

## 2025-04-01 PROCEDURE — 1036F TOBACCO NON-USER: CPT | Performed by: FAMILY MEDICINE

## 2025-04-01 PROCEDURE — 3079F DIAST BP 80-89 MM HG: CPT | Performed by: FAMILY MEDICINE

## 2025-04-01 RX ORDER — TOBRAMYCIN 3 MG/ML
SOLUTION/ DROPS OPHTHALMIC
Qty: 5 ML | Refills: 0 | Status: SHIPPED | OUTPATIENT
Start: 2025-04-01

## 2025-04-01 RX ORDER — DOXYCYCLINE 100 MG/1
100 CAPSULE ORAL 2 TIMES DAILY
Qty: 14 CAPSULE | Refills: 0 | Status: SHIPPED | OUTPATIENT
Start: 2025-04-01 | End: 2025-04-08

## 2025-04-01 RX ORDER — BENZONATATE 200 MG/1
200 CAPSULE ORAL 3 TIMES DAILY PRN
Qty: 30 CAPSULE | Refills: 0 | Status: SHIPPED | OUTPATIENT
Start: 2025-04-01

## 2025-04-01 ASSESSMENT — PATIENT HEALTH QUESTIONNAIRE - PHQ9
1. LITTLE INTEREST OR PLEASURE IN DOING THINGS: NOT AT ALL
SUM OF ALL RESPONSES TO PHQ9 QUESTIONS 1 AND 2: 0
2. FEELING DOWN, DEPRESSED OR HOPELESS: NOT AT ALL

## 2025-04-01 NOTE — PROGRESS NOTES
Chief Complaint  Patient ID: Khadar Hale is a 74 y.o. male who presents for Sinusitis.    Past Medical, Surgical, and Family History reviewed and updated in chart.    Reviewed all medications by prescribing practitioner or clinical pharmacist (such as prescriptions, OTCs, herbal therapies and supplements) and documented in the medical record.    History of Present Illness  Mathew reports that approximately six days ago, he was working outdoors in the cold rain without a coat for several consecutive days. This past weekend, he remained bedridden for two days. Yesterday, he attempted to return to work but found his symptoms--coughing, significant fatigue, body aches, and a subjective fever--too overwhelming to manage. Consequently, he returned home, went back to bed, and is visiting the office today.     He mentions a slight sore throat attributed to postnasal drip. His cough is nonproductive and dry, frequently waking him throughout the night, which he identifies as his most pressing symptom. It should be noted that he did not receive the influenza vaccine this year.    Additionally, he has been experiencing excessive lacrimation bilaterally and diffuse redness in his left eye, although he reports no visual disturbances.    Review of Systems  All pertinent positive symptoms are included in the history of present illness.    All other systems have been reviewed and are negative and noncontributory to this patient's current ailments.    Past Medical History  He has a past medical history of Benign prostatic hyperplasia without lower urinary tract symptoms (11/29/2022), Encounter for general adult medical examination without abnormal findings (01/28/2020), Encounter for general adult medical examination without abnormal findings (04/06/2021), Encounter for screening for malignant neoplasm of colon (11/11/2020), Hyperlipidemia, unspecified (11/29/2022), Other hypertrophic disorders of the skin (08/25/2016), Personal  "history of other benign neoplasm (09/18/2019), Personal history of other diseases of the musculoskeletal system and connective tissue (09/19/2019), Personal history of other infectious and parasitic diseases (09/18/2019), Personal history of other specified conditions (08/25/2016), and Ventricular premature depolarization (03/26/2018).    Surgical History  He has a past surgical history that includes Hernia repair (05/29/2014); Hand tendon surgery (05/29/2014); and Ablation of dysrhythmic focus (05/29/2014).     Social History  He reports that he has never smoked. He has never used smokeless tobacco. No history on file for alcohol use and drug use.    No family history on file.  Outpatient Medications Prior to Visit   Medication Sig Dispense Refill    empagliflozin (Jardiance) 10 mg Take 1 tablet (10 mg) by mouth once daily. 90 tablet 1    rosuvastatin (Crestor) 10 mg tablet Take 1 tablet (10 mg) by mouth once daily. 90 tablet 1    tamsulosin (Flomax) 0.4 mg 24 hr capsule Take 1 capsule (0.4 mg) by mouth once daily. 90 capsule 1     No facility-administered medications prior to visit.     Allergies  Codeine    Immunization History   Administered Date(s) Administered    COVID-19, mRNA, LNP-S, PF, 30 mcg/0.3 mL dose 02/06/2021, 03/01/2021, 12/04/2021     Objective   Visit Vitals  /80   Pulse 67   Ht 1.702 m (5' 7\")   Wt 62.6 kg (138 lb)   BMI 21.61 kg/m²   Smoking Status Never   BSA 1.72 m²        BP Readings from Last 3 Encounters:   04/01/25 124/80   02/12/25 120/70   01/16/25 124/80      Wt Readings from Last 3 Encounters:   04/01/25 62.6 kg (138 lb)   02/12/25 64.9 kg (143 lb)   01/16/25 66.2 kg (146 lb)     Relevant Results  Office Visit on 04/01/2025   Component Date Value    POC Rapid Influenza A 04/01/2025 Negative     POC Rapid Influenza B 04/01/2025 Negative      Physical Exam  CONSTITUTIONAL - well nourished, well developed, looks like stated age, in no acute distress, not ill-appearing, and not tired " appearing  SKIN - normal skin color and pigmentation, normal skin turgor without rash, lesions, or nodules visualized  HEAD - no trauma, normocephalic  EYES - pupils are equal and reactive to light, extraocular muscles are intact; diffuse left-sided conjunctival injection with crusting and matting of the lashes, excess lacrimation  ENT - TM's intact, no injection, no signs of infection, uvula midline, normal tongue movement and throat erythematous, no exudate, nasal passage without discharge and patent  CHEST - clear to auscultation, no wheezing, no crackles and no rales, good effort, but dry cough throughout exam  CARDIAC - regular rate and regular rhythm, no skipped beats, no murmur  EXTREMITIES - no obvious or evident edema, no obvious or evident deformities  NEUROLOGICAL - normal gait, normal balance, normal motor, no ataxia, alert, oriented and no focal signs  PSYCHIATRIC - alert, pleasant and cordial, age-appropriate    Assessment and Plan  Problem List Items Addressed This Visit       Bacterial pneumonia - Primary     I am concerned that you may have underlying pneumonia, so please consider antibiotic therapy provided  Risks, benefits, and options of treatment(s) were discussed after reviewing all current medication(s) and drug allergy(ies)  I opted for the treatment that we discussed with instructions on the medication use for your underlying medical ailment(s)  I encouraged supportive care such as rest, fluids and Advil/Tylenol as warranted  Return to the clinic in 3-5 days or sooner if symptoms worsen or persist as we will then further evaluate         Relevant Medications    doxycycline (Vibramycin) 100 mg capsule    Acute cough     Rapid flu is negative  Tessalon Perles provided for supportive care         Relevant Medications    benzonatate (Tessalon) 200 mg capsule    Other Relevant Orders    POCT Influenza A/B manually resulted (Completed)    Chills    Relevant Orders    POCT Influenza A/B manually  resulted (Completed)    Acute bacterial conjunctivitis of left eye     Risks, benefits, and options of medication(s) above were discussed with instructions on the medication usage for underlying medical ailment(s)    I suggested changing pillow linens for at least the next 2 nights, making certain that proper hygiene is followed including washing of the hands, and using medication as instructed    I encouraged supportive care such as rest, fluids and Advil/Tylenol as warranted    Notify me in 3 to 4 days or sooner if symptoms worsen or persist as we will then consider ophthalmology consultation         Relevant Medications    tobramycin (Tobrex) 0.3 % ophthalmic solution

## 2025-04-01 NOTE — ASSESSMENT & PLAN NOTE
I am concerned that you may have underlying pneumonia, so please consider antibiotic therapy provided  Risks, benefits, and options of treatment(s) were discussed after reviewing all current medication(s) and drug allergy(ies)  I opted for the treatment that we discussed with instructions on the medication use for your underlying medical ailment(s)  I encouraged supportive care such as rest, fluids and Advil/Tylenol as warranted  Return to the clinic in 3-5 days or sooner if symptoms worsen or persist as we will then further evaluate

## 2025-05-30 ENCOUNTER — OFFICE VISIT (OUTPATIENT)
Dept: PRIMARY CARE | Facility: CLINIC | Age: 75
End: 2025-05-30
Payer: MEDICARE

## 2025-05-30 VITALS
HEIGHT: 67 IN | HEART RATE: 66 BPM | BODY MASS INDEX: 21.03 KG/M2 | SYSTOLIC BLOOD PRESSURE: 120 MMHG | DIASTOLIC BLOOD PRESSURE: 70 MMHG | WEIGHT: 134 LBS

## 2025-05-30 DIAGNOSIS — M25.572 PAIN IN LATERAL PORTION OF LEFT ANKLE: Primary | ICD-10-CM

## 2025-05-30 PROCEDURE — 3074F SYST BP LT 130 MM HG: CPT | Performed by: FAMILY MEDICINE

## 2025-05-30 PROCEDURE — 1036F TOBACCO NON-USER: CPT | Performed by: FAMILY MEDICINE

## 2025-05-30 PROCEDURE — 1159F MED LIST DOCD IN RCRD: CPT | Performed by: FAMILY MEDICINE

## 2025-05-30 PROCEDURE — 3078F DIAST BP <80 MM HG: CPT | Performed by: FAMILY MEDICINE

## 2025-05-30 PROCEDURE — 99213 OFFICE O/P EST LOW 20 MIN: CPT | Performed by: FAMILY MEDICINE

## 2025-05-30 PROCEDURE — 3008F BODY MASS INDEX DOCD: CPT | Performed by: FAMILY MEDICINE

## 2025-05-30 RX ORDER — METHYLPREDNISOLONE 4 MG/1
TABLET ORAL
Qty: 21 TABLET | Refills: 0 | Status: SHIPPED | OUTPATIENT
Start: 2025-05-30

## 2025-05-30 NOTE — ASSESSMENT & PLAN NOTE
We have ordered an X-ray of your left lower leg and ankle to check for any obvious causes of your symptoms. Additionally, we have prescribed a Medrol Dosepak to address what we suspect is inflammation in your ankle.     If this treatment does not improve your symptoms, we may need to consider the possibility of diabetic-induced neuropathy and explore treatment options for that condition.

## 2025-05-30 NOTE — PROGRESS NOTES
Chief Complaint  Patient ID: Khadar Hale is a 74 y.o. male who presents for Ankle Pain.    Past Medical, Surgical, and Family History reviewed and updated in chart.    Reviewed all medications by prescribing practitioner or clinical pharmacist (such as prescriptions, OTCs, herbal therapies and supplements) and documented in the medical record.    History of Present Illness  Khadar presents with a 4 to 5-day history of left outer ankle pain. The discomfort radiates from the outer ankle up the lateral calf, stopping short of the knee. He denies any trauma to the area or swelling. The pain is not throbbing, and he denies any numbness or tingling, despite having a history of well-controlled type II diabetes mellitus. The discomfort is exacerbated by long walks.    Review of Systems  All pertinent positive symptoms are included in the history of present illness.    All other systems have been reviewed and are negative and noncontributory to this patient's current ailments.    Past Medical History  He has a past medical history of Benign prostatic hyperplasia without lower urinary tract symptoms (11/29/2022), Encounter for general adult medical examination without abnormal findings (01/28/2020), Encounter for general adult medical examination without abnormal findings (04/06/2021), Encounter for screening for malignant neoplasm of colon (11/11/2020), Hyperlipidemia, unspecified (11/29/2022), Other hypertrophic disorders of the skin (08/25/2016), Personal history of other benign neoplasm (09/18/2019), Personal history of other diseases of the musculoskeletal system and connective tissue (09/19/2019), Personal history of other infectious and parasitic diseases (09/18/2019), Personal history of other specified conditions (08/25/2016), and Ventricular premature depolarization (03/26/2018).    Surgical History  He has a past surgical history that includes Hernia repair (05/29/2014); Hand tendon surgery (05/29/2014); and  "Ablation of dysrhythmic focus (05/29/2014).     Social History  He reports that he has never smoked. He has never used smokeless tobacco. No history on file for alcohol use and drug use.    Family History[1]  Medications Prior to Visit[2]    Allergies  Codeine    Immunization History   Administered Date(s) Administered    COVID-19, mRNA, LNP-S, PF, 30 mcg/0.3 mL dose 02/06/2021, 03/01/2021, 12/04/2021     Objective   Visit Vitals  /70   Pulse 66   Ht 1.702 m (5' 7\")   Wt 60.8 kg (134 lb)   BMI 20.99 kg/m²   Smoking Status Never   BSA 1.7 m²        BP Readings from Last 3 Encounters:   05/30/25 120/70   04/01/25 124/80   02/12/25 120/70      Wt Readings from Last 3 Encounters:   05/30/25 60.8 kg (134 lb)   04/01/25 62.6 kg (138 lb)   02/12/25 64.9 kg (143 lb)     Physical Exam  CONSTITUTIONAL - well nourished, well developed, looks like stated age, in no acute distress, not ill-appearing, and not tired appearing  SKIN - normal skin color and pigmentation, normal skin turgor without rash, lesions, or nodules visualized  HEAD - no trauma, normocephalic  EYES - pupils are equal and reactive to light, extraocular muscles are intact, and normal external exam  CHEST - clear to auscultation, no wheezing, no crackles and no rales, good effort  CARDIAC - regular rate and regular rhythm, no skipped beats, no murmur  ABDOMEN - no organomegaly, soft, nontender, nondistended, normal bowel sounds, no   EXTREMITIES - no obvious or evident edema, no obvious or evident deformities. Left ankle with no redness or swelling. Mild tenderness to palpation in the lateral calf   NEUROLOGICAL - alert, oriented and no focal signs  PSYCHIATRIC - alert, pleasant and cordial, age-appropriate    Assessment and Plan  Problem List Items Addressed This Visit       Pain in lateral portion of left ankle - Primary    We have ordered an X-ray of your left lower leg and ankle to check for any obvious causes of your symptoms. Additionally, we have " prescribed a Medrol Dosepak to address what we suspect is inflammation in your ankle.     If this treatment does not improve your symptoms, we may need to consider the possibility of diabetic-induced neuropathy and explore treatment options for that condition.         Relevant Medications    methylPREDNISolone (Medrol, Jose Antonio,) 4 mg tablets    Other Relevant Orders    XR ankle left 2 views    XR tibia fibula left 2 views          [1] No family history on file.  [2]   Outpatient Medications Prior to Visit   Medication Sig Dispense Refill    benzonatate (Tessalon) 200 mg capsule Take 1 capsule (200 mg) by mouth 3 times a day as needed for cough. Do not crush or chew. 30 capsule 0    empagliflozin (Jardiance) 10 mg Take 1 tablet (10 mg) by mouth once daily. 90 tablet 1    rosuvastatin (Crestor) 10 mg tablet Take 1 tablet (10 mg) by mouth once daily. 90 tablet 1    tamsulosin (Flomax) 0.4 mg 24 hr capsule Take 1 capsule (0.4 mg) by mouth once daily. 90 capsule 1    tobramycin (Tobrex) 0.3 % ophthalmic solution 1 drop 4 times daily to left eye until infection gone, then 1 drop 4 times daily x 24 more hours 5 mL 0     No facility-administered medications prior to visit.

## 2025-06-02 ENCOUNTER — HOSPITAL ENCOUNTER (OUTPATIENT)
Dept: RADIOLOGY | Facility: CLINIC | Age: 75
Discharge: HOME | End: 2025-06-02
Payer: MEDICARE

## 2025-06-02 DIAGNOSIS — M25.572 PAIN IN LATERAL PORTION OF LEFT ANKLE: ICD-10-CM

## 2025-06-02 PROCEDURE — 73590 X-RAY EXAM OF LOWER LEG: CPT | Mod: LT

## 2025-06-02 PROCEDURE — 73590 X-RAY EXAM OF LOWER LEG: CPT | Mod: LEFT SIDE | Performed by: RADIOLOGY

## 2025-06-02 PROCEDURE — 73600 X-RAY EXAM OF ANKLE: CPT | Mod: LT

## 2025-06-03 NOTE — RESULT ENCOUNTER NOTE
We do not see any fracture in the area of concern, there are some spurs and some arthritic changes, so if there is no improvement please let us know so that we can get you a specialist to evaluate.  We were hoping that we would see something that could have triggered this such as a condition called pseudogout but we do not see any of that in your foot, ankle, or even your leg area.

## 2025-06-30 DIAGNOSIS — E78.2 MIXED HYPERLIPIDEMIA: ICD-10-CM

## 2025-06-30 DIAGNOSIS — E11.9 CONTROLLED TYPE 2 DIABETES MELLITUS WITHOUT COMPLICATION, WITHOUT LONG-TERM CURRENT USE OF INSULIN: ICD-10-CM

## 2025-07-08 LAB
ALBUMIN SERPL-MCNC: 4.4 G/DL (ref 3.6–5.1)
ALBUMIN/CREAT UR: 6 MG/G CREAT
ALP SERPL-CCNC: 65 U/L (ref 35–144)
ALT SERPL-CCNC: 30 U/L (ref 9–46)
ANION GAP SERPL CALCULATED.4IONS-SCNC: 12 MMOL/L (CALC) (ref 7–17)
AST SERPL-CCNC: 25 U/L (ref 10–35)
BILIRUB SERPL-MCNC: 0.7 MG/DL (ref 0.2–1.2)
BUN SERPL-MCNC: 17 MG/DL (ref 7–25)
CALCIUM SERPL-MCNC: 9.1 MG/DL (ref 8.6–10.3)
CHLORIDE SERPL-SCNC: 105 MMOL/L (ref 98–110)
CHOLEST SERPL-MCNC: 131 MG/DL
CHOLEST/HDLC SERPL: 2.7 (CALC)
CO2 SERPL-SCNC: 24 MMOL/L (ref 20–32)
CREAT SERPL-MCNC: 0.75 MG/DL (ref 0.7–1.28)
CREAT UR-MCNC: 77 MG/DL (ref 20–320)
EGFRCR SERPLBLD CKD-EPI 2021: 95 ML/MIN/1.73M2
EST. AVERAGE GLUCOSE BLD GHB EST-MCNC: 131 MG/DL
EST. AVERAGE GLUCOSE BLD GHB EST-SCNC: 7.3 MMOL/L
GLUCOSE SERPL-MCNC: 116 MG/DL (ref 65–99)
HBA1C MFR BLD: 6.2 %
HDLC SERPL-MCNC: 49 MG/DL
LDLC SERPL CALC-MCNC: 63 MG/DL (CALC)
MICROALBUMIN UR-MCNC: 0.5 MG/DL
NONHDLC SERPL-MCNC: 82 MG/DL (CALC)
POTASSIUM SERPL-SCNC: 4.8 MMOL/L (ref 3.5–5.3)
PROT SERPL-MCNC: 6.6 G/DL (ref 6.1–8.1)
SODIUM SERPL-SCNC: 141 MMOL/L (ref 135–146)
TRIGL SERPL-MCNC: 109 MG/DL

## 2025-07-21 ENCOUNTER — APPOINTMENT (OUTPATIENT)
Dept: PRIMARY CARE | Facility: CLINIC | Age: 75
End: 2025-07-21
Payer: MEDICARE

## 2025-07-21 VITALS
HEART RATE: 79 BPM | DIASTOLIC BLOOD PRESSURE: 74 MMHG | BODY MASS INDEX: 21.05 KG/M2 | WEIGHT: 134.4 LBS | SYSTOLIC BLOOD PRESSURE: 118 MMHG

## 2025-07-21 DIAGNOSIS — E78.2 MIXED HYPERLIPIDEMIA: ICD-10-CM

## 2025-07-21 DIAGNOSIS — N40.1 BENIGN PROSTATIC HYPERPLASIA WITH URINARY OBSTRUCTION: ICD-10-CM

## 2025-07-21 DIAGNOSIS — E11.9 CONTROLLED TYPE 2 DIABETES MELLITUS WITHOUT COMPLICATION, WITHOUT LONG-TERM CURRENT USE OF INSULIN: ICD-10-CM

## 2025-07-21 DIAGNOSIS — N13.8 BENIGN PROSTATIC HYPERPLASIA WITH URINARY OBSTRUCTION: ICD-10-CM

## 2025-07-21 DIAGNOSIS — E11.9 CONTROLLED TYPE 2 DIABETES MELLITUS WITHOUT COMPLICATION, WITHOUT LONG-TERM CURRENT USE OF INSULIN: Primary | ICD-10-CM

## 2025-07-21 PROBLEM — M67.912 DYSFUNCTION OF LEFT ROTATOR CUFF: Status: RESOLVED | Noted: 2023-09-29 | Resolved: 2025-07-21

## 2025-07-21 PROBLEM — J02.9 SORE THROAT: Status: RESOLVED | Noted: 2025-02-12 | Resolved: 2025-07-21

## 2025-07-21 PROBLEM — L29.9 ITCHING: Status: RESOLVED | Noted: 2024-03-27 | Resolved: 2025-07-21

## 2025-07-21 PROBLEM — H10.32 ACUTE BACTERIAL CONJUNCTIVITIS OF LEFT EYE: Status: RESOLVED | Noted: 2025-04-01 | Resolved: 2025-07-21

## 2025-07-21 PROBLEM — R05.1 ACUTE COUGH: Status: RESOLVED | Noted: 2025-04-01 | Resolved: 2025-07-21

## 2025-07-21 PROBLEM — R68.83 CHILLS: Status: RESOLVED | Noted: 2025-04-01 | Resolved: 2025-07-21

## 2025-07-21 PROBLEM — J15.9 BACTERIAL PNEUMONIA: Status: RESOLVED | Noted: 2025-04-01 | Resolved: 2025-07-21

## 2025-07-21 PROBLEM — B35.0 TINEA CAPITIS: Status: RESOLVED | Noted: 2024-03-27 | Resolved: 2025-07-21

## 2025-07-21 PROCEDURE — 3074F SYST BP LT 130 MM HG: CPT | Performed by: FAMILY MEDICINE

## 2025-07-21 PROCEDURE — 1160F RVW MEDS BY RX/DR IN RCRD: CPT | Performed by: FAMILY MEDICINE

## 2025-07-21 PROCEDURE — 3078F DIAST BP <80 MM HG: CPT | Performed by: FAMILY MEDICINE

## 2025-07-21 PROCEDURE — 99214 OFFICE O/P EST MOD 30 MIN: CPT | Performed by: FAMILY MEDICINE

## 2025-07-21 PROCEDURE — 1036F TOBACCO NON-USER: CPT | Performed by: FAMILY MEDICINE

## 2025-07-21 PROCEDURE — G2211 COMPLEX E/M VISIT ADD ON: HCPCS | Performed by: FAMILY MEDICINE

## 2025-07-21 PROCEDURE — 1159F MED LIST DOCD IN RCRD: CPT | Performed by: FAMILY MEDICINE

## 2025-07-21 RX ORDER — ROSUVASTATIN CALCIUM 10 MG/1
10 TABLET, COATED ORAL DAILY
Qty: 90 TABLET | Refills: 1 | Status: SHIPPED | OUTPATIENT
Start: 2025-07-21 | End: 2026-01-17

## 2025-07-21 RX ORDER — TAMSULOSIN HYDROCHLORIDE 0.4 MG/1
0.4 CAPSULE ORAL DAILY
Qty: 90 CAPSULE | Refills: 1 | Status: SHIPPED | OUTPATIENT
Start: 2025-07-21 | End: 2026-01-17

## 2025-07-21 RX ORDER — TAMSULOSIN HYDROCHLORIDE 0.4 MG/1
0.4 CAPSULE ORAL DAILY
Qty: 90 CAPSULE | Refills: 3 | OUTPATIENT
Start: 2025-07-21

## 2025-07-21 RX ORDER — ROSUVASTATIN CALCIUM 10 MG/1
10 TABLET, COATED ORAL DAILY
Qty: 90 TABLET | Refills: 3 | OUTPATIENT
Start: 2025-07-21

## 2025-07-21 RX ORDER — EMPAGLIFLOZIN 10 MG/1
10 TABLET, FILM COATED ORAL DAILY
Qty: 90 TABLET | Refills: 3 | OUTPATIENT
Start: 2025-07-21

## 2025-07-21 NOTE — PROGRESS NOTES
Chief Complaint  Patient ID: Khadar Hale is a 74 y.o. male who presents for Diabetes, Hyperlipidemia, and Benign Prostatic Hypertrophy.    Past Medical, Surgical, and Family History reviewed and updated in chart.    Reviewed all medications by prescribing practitioner or clinical pharmacist (such as prescriptions, OTCs, herbal therapies and supplements) and documented in the medical record.    History of Present Illness  1. Hyperlipidemia     - Currently taking rosuvastatin 10 mg daily.     - Blood work from 2 weeks ago noted below.  Cholesterol continues to be excellent with LDL 63.    2. Diabetes     - Hemoglobin A1c is excellent at 6.2, with previous readings of 5.8, 6.8, and 6.1.     - Currently taking Jardiance as noted in the chart.  Tolerates well.    3. BPH     - Currently taking tamsulosin 0.4 mg at bedtime.     - PSA is 4.20, with previous readings of 4.49, 3.61, 2.88, 2.63, and 2.62.    Review of Systems  All pertinent positive symptoms are included in the history of present illness.    All other systems have been reviewed and are negative and noncontributory to this patient's current ailments.    Social History  He reports that he has never smoked. He has never used smokeless tobacco. No history on file for alcohol use and drug use.    Allergies  Codeine    Objective   Visit Vitals  /74 (BP Location: Left arm, Patient Position: Sitting, BP Cuff Size: Adult)   Pulse 79   Wt 61 kg (134 lb 6.4 oz)   BMI 21.05 kg/m²   Smoking Status Never   BSA 1.7 m²        BP Readings from Last 3 Encounters:   07/21/25 118/74   05/30/25 120/70   04/01/25 124/80      Wt Readings from Last 3 Encounters:   07/21/25 61 kg (134 lb 6.4 oz)   05/30/25 60.8 kg (134 lb)   04/01/25 62.6 kg (138 lb)      Relevant Results  Orders Only on 06/30/2025   Component Date Value Ref Range Status    CHOLESTEROL, TOTAL 07/07/2025 131  <200 mg/dL Final    HDL CHOLESTEROL 07/07/2025 49  > OR = 40 mg/dL Final    TRIGLYCERIDES 07/07/2025  109  <150 mg/dL Final    LDL-CHOLESTEROL 07/07/2025 63  mg/dL (calc) Final    Comment: Reference range: <100     Desirable range <100 mg/dL for primary prevention;    <70 mg/dL for patients with CHD or diabetic patients   with > or = 2 CHD risk factors.     LDL-C is now calculated using the Ross   calculation, which is a validated novel method providing   better accuracy than the Friedewald equation in the   estimation of LDL-C.   Vasu SS et al. TITI. 2013;310(75): 5110-8532   (http://[employer]/faq/JOC711)      CHOL/HDLC RATIO 07/07/2025 2.7  <5.0 (calc) Final    NON HDL CHOLESTEROL 07/07/2025 82  <130 mg/dL (calc) Final    Comment: For patients with diabetes plus 1 major ASCVD risk   factor, treating to a non-HDL-C goal of <100 mg/dL   (LDL-C of <70 mg/dL) is considered a therapeutic   option.      GLUCOSE 07/07/2025 116 (H)  65 - 99 mg/dL Final    Comment:               Fasting reference interval     For someone without known diabetes, a glucose value  between 100 and 125 mg/dL is consistent with  prediabetes and should be confirmed with a  follow-up test.         UREA NITROGEN (BUN) 07/07/2025 17  7 - 25 mg/dL Final    CREATININE 07/07/2025 0.75  0.70 - 1.28 mg/dL Final    EGFR 07/07/2025 95  > OR = 60 mL/min/1.73m2 Final    SODIUM 07/07/2025 141  135 - 146 mmol/L Final    POTASSIUM 07/07/2025 4.8  3.5 - 5.3 mmol/L Final    CHLORIDE 07/07/2025 105  98 - 110 mmol/L Final    CARBON DIOXIDE 07/07/2025 24  20 - 32 mmol/L Final    ELECTROLYTE BALANCE 07/07/2025 12  7 - 17 mmol/L (calc) Final    CALCIUM 07/07/2025 9.1  8.6 - 10.3 mg/dL Final    PROTEIN, TOTAL 07/07/2025 6.6  6.1 - 8.1 g/dL Final    ALBUMIN 07/07/2025 4.4  3.6 - 5.1 g/dL Final    BILIRUBIN, TOTAL 07/07/2025 0.7  0.2 - 1.2 mg/dL Final    ALKALINE PHOSPHATASE 07/07/2025 65  35 - 144 U/L Final    AST 07/07/2025 25  10 - 35 U/L Final    ALT 07/07/2025 30  9 - 46 U/L Final    HEMOGLOBIN A1c 07/07/2025 6.2 (H)  <5.7 % Final     Comment: For someone without known diabetes, a hemoglobin   A1c value between 5.7% and 6.4% is consistent with  prediabetes and should be confirmed with a   follow-up test.     For someone with known diabetes, a value <7%  indicates that their diabetes is well controlled. A1c  targets should be individualized based on duration of  diabetes, age, comorbid conditions, and other  considerations.     This assay result is consistent with an increased risk  of diabetes.     Currently, no consensus exists regarding use of  hemoglobin A1c for diagnosis of diabetes for children.         eAG (mg/dL) 07/07/2025 131  mg/dL Final    eAG (mmol/L) 07/07/2025 7.3  mmol/L Final    CREATININE, RANDOM URINE 07/07/2025 77  20 - 320 mg/dL Final    ALBUMIN, URINE 07/07/2025 0.5  See Note: mg/dL Final    Comment: Reference Range:    Reference Range  Not established      ALBUMIN/CREATININE RATIO, RANDOM U* 07/07/2025 6  <30 mg/g creat Final    Comment:    The ADA defines abnormalities in albumin  excretion as follows:     Albuminuria Category        Result (mg/g creatinine)     Normal to Mildly increased   <30  Moderately increased            Severely increased           > OR = 300     The ADA recommends that at least two of three  specimens collected within a 3-6 month period be  abnormal before considering a patient to be  within a diagnostic category.       Physical Exam  CONSTITUTIONAL - well nourished, well developed, thin appearing, looks like stated age, in no acute distress, not ill-appearing, and not tired appearing  SKIN - normal skin color and pigmentation, normal skin turgor without rash, lesions, or nodules visualized  HEAD - no trauma, normocephalic  EYES - pupils are equal and reactive to light, extraocular muscles are intact, and normal external exam  CHEST - clear to auscultation, no wheezing, no crackles and no rales, good effort  CARDIAC - regular rate and regular rhythm, no skipped beats, no murmur  EXTREMITIES -  no obvious or evident edema, no obvious or evident deformities  NEUROLOGICAL - normal gait, normal balance, normal motor, no ataxia, alert, oriented and no focal signs  PSYCHIATRIC - alert, pleasant and cordial, age-appropriate    Assessment and Plan  Problem List Items Addressed This Visit       Benign enlargement of prostate    Stable, no changes to medication recommended  PSA should be checked in 6 months at your next visit         Relevant Medications    tamsulosin (Flomax) 0.4 mg 24 hr capsule    Hyperlipidemia    Stable, no changes to medication recommended         Relevant Medications    rosuvastatin (Crestor) 10 mg tablet    Controlled type 2 diabetes mellitus without complication, without long-term current use of insulin - Primary    Stable, no changes to medication recommended         Relevant Medications    empagliflozin (Jardiance) 10 mg tablet    rosuvastatin (Crestor) 10 mg tablet

## 2025-07-21 NOTE — ASSESSMENT & PLAN NOTE
Stable, no changes to medication recommended  PSA should be checked in 6 months at your next visit

## 2026-01-06 ENCOUNTER — APPOINTMENT (OUTPATIENT)
Dept: PRIMARY CARE | Facility: CLINIC | Age: 76
End: 2026-01-06
Payer: MEDICARE